# Patient Record
Sex: FEMALE | Race: BLACK OR AFRICAN AMERICAN | URBAN - METROPOLITAN AREA
[De-identification: names, ages, dates, MRNs, and addresses within clinical notes are randomized per-mention and may not be internally consistent; named-entity substitution may affect disease eponyms.]

---

## 2018-04-02 ENCOUNTER — OFFICE VISIT (OUTPATIENT)
Dept: FAMILY MEDICINE CLINIC | Age: 28
End: 2018-04-02

## 2018-04-02 VITALS
OXYGEN SATURATION: 98 % | SYSTOLIC BLOOD PRESSURE: 171 MMHG | HEIGHT: 60 IN | DIASTOLIC BLOOD PRESSURE: 120 MMHG | HEART RATE: 112 BPM | RESPIRATION RATE: 18 BRPM | TEMPERATURE: 100.3 F

## 2018-04-02 DIAGNOSIS — I10 ESSENTIAL HYPERTENSION: ICD-10-CM

## 2018-04-02 DIAGNOSIS — Z76.89 ENCOUNTER TO ESTABLISH CARE: Primary | ICD-10-CM

## 2018-04-02 RX ORDER — AMLODIPINE BESYLATE 5 MG/1
5 TABLET ORAL DAILY
Qty: 30 TAB | Refills: 0 | Status: SHIPPED | OUTPATIENT
Start: 2018-04-02 | End: 2018-04-25 | Stop reason: SDUPTHER

## 2018-04-02 RX ORDER — HYDROCHLOROTHIAZIDE 25 MG/1
12.5 TABLET ORAL DAILY
Qty: 30 TAB | Refills: 0 | Status: SHIPPED | OUTPATIENT
Start: 2018-04-02 | End: 2018-04-25 | Stop reason: SDUPTHER

## 2018-04-02 RX ORDER — POTASSIUM CHLORIDE 750 MG/1
10 TABLET, EXTENDED RELEASE ORAL DAILY
Qty: 30 TAB | Refills: 0 | Status: SHIPPED | OUTPATIENT
Start: 2018-04-02 | End: 2018-05-18 | Stop reason: SDUPTHER

## 2018-04-02 NOTE — PATIENT INSTRUCTIONS
High Blood Pressure: Care Instructions  Your Care Instructions    If your blood pressure is usually above 140/90, you have high blood pressure, or hypertension. That means the top number is 140 or higher or the bottom number is 90 or higher, or both. Despite what a lot of people think, high blood pressure usually doesn't cause headaches or make you feel dizzy or lightheaded. It usually has no symptoms. But it does increase your risk for heart attack, stroke, and kidney or eye damage. The higher your blood pressure, the more your risk increases. Your doctor will give you a goal for your blood pressure. Your goal will be based on your health and your age. An example of a goal is to keep your blood pressure below 140/90. Lifestyle changes, such as eating healthy and being active, are always important to help lower blood pressure. You might also take medicine to reach your blood pressure goal.  Follow-up care is a key part of your treatment and safety. Be sure to make and go to all appointments, and call your doctor if you are having problems. It's also a good idea to know your test results and keep a list of the medicines you take. How can you care for yourself at home? Medical treatment  · If you stop taking your medicine, your blood pressure will go back up. You may take one or more types of medicine to lower your blood pressure. Be safe with medicines. Take your medicine exactly as prescribed. Call your doctor if you think you are having a problem with your medicine. · Talk to your doctor before you start taking aspirin every day. Aspirin can help certain people lower their risk of a heart attack or stroke. But taking aspirin isn't right for everyone, because it can cause serious bleeding. · See your doctor regularly. You may need to see the doctor more often at first or until your blood pressure comes down.   · If you are taking blood pressure medicine, talk to your doctor before you take decongestants or anti-inflammatory medicine, such as ibuprofen. Some of these medicines can raise blood pressure. · Learn how to check your blood pressure at home. Lifestyle changes  · Stay at a healthy weight. This is especially important if you put on weight around the waist. Losing even 10 pounds can help you lower your blood pressure. · If your doctor recommends it, get more exercise. Walking is a good choice. Bit by bit, increase the amount you walk every day. Try for at least 30 minutes on most days of the week. You also may want to swim, bike, or do other activities. · Avoid or limit alcohol. Talk to your doctor about whether you can drink any alcohol. · Try to limit how much sodium you eat to less than 2,300 milligrams (mg) a day. Your doctor may ask you to try to eat less than 1,500 mg a day. · Eat plenty of fruits (such as bananas and oranges), vegetables, legumes, whole grains, and low-fat dairy products. · Lower the amount of saturated fat in your diet. Saturated fat is found in animal products such as milk, cheese, and meat. Limiting these foods may help you lose weight and also lower your risk for heart disease. · Do not smoke. Smoking increases your risk for heart attack and stroke. If you need help quitting, talk to your doctor about stop-smoking programs and medicines. These can increase your chances of quitting for good. When should you call for help? Call 911 anytime you think you may need emergency care. This may mean having symptoms that suggest that your blood pressure is causing a serious heart or blood vessel problem. Your blood pressure may be over 180/110. ? For example, call 911 if:  ? · You have symptoms of a heart attack. These may include:  ¨ Chest pain or pressure, or a strange feeling in the chest.  ¨ Sweating. ¨ Shortness of breath. ¨ Nausea or vomiting.   ¨ Pain, pressure, or a strange feeling in the back, neck, jaw, or upper belly or in one or both shoulders or arms.  ¨ Lightheadedness or sudden weakness. ¨ A fast or irregular heartbeat. ? · You have symptoms of a stroke. These may include:  ¨ Sudden numbness, tingling, weakness, or loss of movement in your face, arm, or leg, especially on only one side of your body. ¨ Sudden vision changes. ¨ Sudden trouble speaking. ¨ Sudden confusion or trouble understanding simple statements. ¨ Sudden problems with walking or balance. ¨ A sudden, severe headache that is different from past headaches. ? · You have severe back or belly pain. ?Do not wait until your blood pressure comes down on its own. Get help right away. ?Call your doctor now or seek immediate care if:  ? · Your blood pressure is much higher than normal (such as 180/110 or higher), but you don't have symptoms. ? · You think high blood pressure is causing symptoms, such as:  ¨ Severe headache. ¨ Blurry vision. ? Watch closely for changes in your health, and be sure to contact your doctor if:  ? · Your blood pressure measures 140/90 or higher at least 2 times. That means the top number is 140 or higher or the bottom number is 90 or higher, or both. ? · You think you may be having side effects from your blood pressure medicine. ? · Your blood pressure is usually normal, but it goes above normal at least 2 times. Where can you learn more? Go to http://michelle-oleg.info/. Enter P135 in the search box to learn more about \"High Blood Pressure: Care Instructions. \"  Current as of: September 21, 2016  Content Version: 11.4  © 3483-6890 [employer]. Care instructions adapted under license by [employer] (which disclaims liability or warranty for this information). If you have questions about a medical condition or this instruction, always ask your healthcare professional. [place] [address] any warranty or liability for your use of this information.        DASH Diet: Care Instructions  Your Care Instructions    The DASH diet is an eating plan that can help lower your blood pressure. DASH stands for Dietary Approaches to Stop Hypertension. Hypertension is high blood pressure. The DASH diet focuses on eating foods that are high in calcium, potassium, and magnesium. These nutrients can lower blood pressure. The foods that are highest in these nutrients are fruits, vegetables, low-fat dairy products, nuts, seeds, and legumes. But taking calcium, potassium, and magnesium supplements instead of eating foods that are high in those nutrients does not have the same effect. The DASH diet also includes whole grains, fish, and poultry. The DASH diet is one of several lifestyle changes your doctor may recommend to lower your high blood pressure. Your doctor may also want you to decrease the amount of sodium in your diet. Lowering sodium while following the DASH diet can lower blood pressure even further than just the DASH diet alone. Follow-up care is a key part of your treatment and safety. Be sure to make and go to all appointments, and call your doctor if you are having problems. It's also a good idea to know your test results and keep a list of the medicines you take. How can you care for yourself at home? Following the DASH diet  · Eat 4 to 5 servings of fruit each day. A serving is 1 medium-sized piece of fruit, ½ cup chopped or canned fruit, 1/4 cup dried fruit, or 4 ounces (½ cup) of fruit juice. Choose fruit more often than fruit juice. · Eat 4 to 5 servings of vegetables each day. A serving is 1 cup of lettuce or raw leafy vegetables, ½ cup of chopped or cooked vegetables, or 4 ounces (½ cup) of vegetable juice. Choose vegetables more often than vegetable juice. · Get 2 to 3 servings of low-fat and fat-free dairy each day. A serving is 8 ounces of milk, 1 cup of yogurt, or 1 ½ ounces of cheese. · Eat 6 to 8 servings of grains each day.  A serving is 1 slice of bread, 1 ounce of dry cereal, or ½ cup of cooked rice, pasta, or cooked cereal. Try to choose whole-grain products as much as possible. · Limit lean meat, poultry, and fish to 2 servings each day. A serving is 3 ounces, about the size of a deck of cards. · Eat 4 to 5 servings of nuts, seeds, and legumes (cooked dried beans, lentils, and split peas) each week. A serving is 1/3 cup of nuts, 2 tablespoons of seeds, or ½ cup of cooked beans or peas. · Limit fats and oils to 2 to 3 servings each day. A serving is 1 teaspoon of vegetable oil or 2 tablespoons of salad dressing. · Limit sweets and added sugars to 5 servings or less a week. A serving is 1 tablespoon jelly or jam, ½ cup sorbet, or 1 cup of lemonade. · Eat less than 2,300 milligrams (mg) of sodium a day. If you limit your sodium to 1,500 mg a day, you can lower your blood pressure even more. Tips for success  · Start small. Do not try to make dramatic changes to your diet all at once. You might feel that you are missing out on your favorite foods and then be more likely to not follow the plan. Make small changes, and stick with them. Once those changes become habit, add a few more changes. · Try some of the following:  ¨ Make it a goal to eat a fruit or vegetable at every meal and at snacks. This will make it easy to get the recommended amount of fruits and vegetables each day. ¨ Try yogurt topped with fruit and nuts for a snack or healthy dessert. ¨ Add lettuce, tomato, cucumber, and onion to sandwiches. ¨ Combine a ready-made pizza crust with low-fat mozzarella cheese and lots of vegetable toppings. Try using tomatoes, squash, spinach, broccoli, carrots, cauliflower, and onions. ¨ Have a variety of cut-up vegetables with a low-fat dip as an appetizer instead of chips and dip. ¨ Sprinkle sunflower seeds or chopped almonds over salads. Or try adding chopped walnuts or almonds to cooked vegetables. ¨ Try some vegetarian meals using beans and peas. Add garbanzo or kidney beans to salads. Make burritos and tacos with mashed méndez beans or black beans. Where can you learn more? Go to http://michelle-oleg.info/. Enter V852 in the search box to learn more about \"DASH Diet: Care Instructions. \"  Current as of: September 21, 2016  Content Version: 11.4  © 0685-7926 [employer]. Care instructions adapted under license by [employer] (which disclaims liability or warranty for this information). If you have questions about a medical condition or this instruction, always ask your healthcare professional. [address] any warranty or liability for your use of this information. Low Sodium Diet (2,000 Milligram): Care Instructions  Your Care Instructions    Too much sodium causes your body to hold on to extra water. This can raise your blood pressure and force your heart and kidneys to work harder. In very serious cases, this could cause you to be put in the hospital. It might even be life-threatening. By limiting sodium, you will feel better and lower your risk of serious problems. The most common source of sodium is salt. People get most of the salt in their diet from canned, prepared, and packaged foods. Fast food and restaurant meals also are very high in sodium. Your doctor will probably limit your sodium to less than 2,000 milligrams (mg) a day. This limit counts all the sodium in prepared and packaged foods and any salt you add to your food. Follow-up care is a key part of your treatment and safety. Be sure to make and go to all appointments, and call your doctor if you are having problems. It's also a good idea to know your test results and keep a list of the medicines you take. How can you care for yourself at home? Read food labels  · Read labels on cans and food packages. The labels tell you how much sodium is in each serving. Make sure that you look at the serving size.  If you eat more than the serving size, you have eaten more sodium. · Food labels also tell you the Percent Daily Value for sodium. Choose products with low Percent Daily Values for sodium. · Be aware that sodium can come in forms other than salt, including monosodium glutamate (MSG), sodium citrate, and sodium bicarbonate (baking soda). MSG is often added to Asian food. When you eat out, you can sometimes ask for food without MSG or added salt. Buy low-sodium foods  · Buy foods that are labeled \"unsalted\" (no salt added), \"sodium-free\" (less than 5 mg of sodium per serving), or \"low-sodium\" (less than 140 mg of sodium per serving). Foods labeled \"reduced-sodium\" and \"light sodium\" may still have too much sodium. Be sure to read the label to see how much sodium you are getting. · Buy fresh vegetables, or frozen vegetables without added sauces. Buy low-sodium versions of canned vegetables, soups, and other canned goods. Prepare low-sodium meals  · Cut back on the amount of salt you use in cooking. This will help you adjust to the taste. Do not add salt after cooking. One teaspoon of salt has about 2,300 mg of sodium. · Take the salt shaker off the table. · Flavor your food with garlic, lemon juice, onion, vinegar, herbs, and spices. Do not use soy sauce, lite soy sauce, steak sauce, onion salt, garlic salt, celery salt, mustard, or ketchup on your food. · Use low-sodium salad dressings, sauces, and ketchup. Or make your own salad dressings and sauces without adding salt. · Use less salt (or none) when recipes call for it. You can often use half the salt a recipe calls for without losing flavor. Other foods such as rice, pasta, and grains do not need added salt. · Rinse canned vegetables, and cook them in fresh water. This removes some-but not all-of the salt. · Avoid water that is naturally high in sodium or that has been treated with water softeners, which add sodium. Call your local water company to find out the sodium content of your water supply.  If you buy bottled water, read the label and choose a sodium-free brand. Avoid high-sodium foods  · Avoid eating:  ¨ Smoked, cured, salted, and canned meat, fish, and poultry. ¨ Ham, macias, hot dogs, and luncheon meats. ¨ Regular, hard, and processed cheese and regular peanut butter. ¨ Crackers with salted tops, and other salted snack foods such as pretzels, chips, and salted popcorn. ¨ Frozen prepared meals, unless labeled low-sodium. ¨ Canned and dried soups, broths, and bouillon, unless labeled sodium-free or low-sodium. ¨ Canned vegetables, unless labeled sodium-free or low-sodium. ¨ Western Eveline fries, pizza, tacos, and other fast foods. ¨ Pickles, olives, ketchup, and other condiments, especially soy sauce, unless labeled sodium-free or low-sodium. Where can you learn more? Go to http://michelle-oleg.info/. Enter Q256 in the search box to learn more about \"Low Sodium Diet (2,000 Milligram): Care Instructions. \"  Current as of: May 12, 2017  Content Version: 11.4  © 5014-2199 [employer]. Care instructions adapted under license by [employer] (which disclaims liability or warranty for this information). If you have questions about a medical condition or this instruction, always ask your healthcare professional. [place] [address] any warranty or liability for your use of this information.

## 2018-04-02 NOTE — PROGRESS NOTES
Chief Complaint   Patient presents with    New Patient     Patient was seen in the [facility] ER 3/2018 for left eye pain. Patient states that she received antibiotics from the ER and was told she had high BP. Patient being seen today to establish care with new provider. 1. Have you been to the ER, urgent care clinic since your last visit? Hospitalized since your last visit? Yes When: 3/2018 Where: [facility] Reason for visit: left eye     2. Have you seen or consulted any other health care providers outside of the [address] [place] [address] since your last visit? Include any pap smears or colon screening.  Yes When: 3/2018 Where: [facility] Reason for visit: Left Eye pain

## 2018-04-02 NOTE — PROGRESS NOTES
HPI  Rosaura Hidalgo is a 32 y.o. female  Chief Complaint   Patient presents with    New Patient     Here to establish care. Has not seen a PCP in over 10 years. Reports going to ER recently for an eye infection which she is on antibiotics for but her symptoms have improved. Reports her blood pressure was elevated. Denies chest pain and or shortness of breath. Denies dizziness or blurry vision. Reports walking 2 hours every other day with just recently restarting exercise. Admits to unhealthy diet. Past Medical History  No past medical history on file. Surgical History  No past surgical history on file. Medications      Allergies  No Known Allergies    Family History  Family History   Problem Relation Age of Onset    Hypertension Mother     Hypertension Father     Hypertension Paternal Grandmother     Hypertension Paternal Grandfather        Social History  Social History     Social History    Marital status: SINGLE     Spouse name: N/A    Number of children: N/A    Years of education: N/A     Occupational History    Not on file. Social History Main Topics    Smoking status: Former Smoker     Start date: 4/2/2008     Quit date: 6/2/2013    Smokeless tobacco: Never Used    Alcohol use 1.2 - 1.8 oz/week     1 - 2 Glasses of wine, 0 Cans of beer, 1 Shots of liquor, 0 Standard drinks or equivalent per week    Drug use: No    Sexual activity: Yes     Partners: Male     Birth control/ protection: Condom     Other Topics Concern    Not on file     Social History Narrative    No narrative on file       Problem List  There is no problem list on file for this patient. Review of Systems  Review of Systems   Eyes: Positive for discharge and redness. Negative for blurred vision. Respiratory: Negative for shortness of breath. Cardiovascular: Negative for chest pain. Gastrointestinal: Negative for abdominal pain, nausea and vomiting. Neurological: Positive for headaches.  Negative for dizziness. Vital Signs  Vitals:    04/02/18 0944 04/02/18 0955 04/02/18 0956   BP: (!) 173/121 (!) 118/116 (!) 171/120   Pulse: (!) 112     Resp: 18     Temp: 100.3 °F (37.9 °C)     TempSrc: Oral     SpO2: 98%     Height: 5' (1.524 m)     PainSc:   0 - No pain     LMP: 03/14/2018       Physical Exam  Physical Exam   Constitutional: She is oriented to person, place, and time. HENT:   Nose: Nose normal.   Mouth/Throat: Oropharynx is clear and moist.   Eyes: Conjunctivae and EOM are normal. Pupils are equal, round, and reactive to light. Cardiovascular: Normal rate, regular rhythm, normal heart sounds and intact distal pulses. No murmur heard. Pulmonary/Chest: Effort normal and breath sounds normal. No respiratory distress. She has no wheezes. Musculoskeletal: She exhibits no edema. Neurological: She is alert and oriented to person, place, and time. Skin: Skin is warm and dry. Psychiatric: She has a normal mood and affect. Her behavior is normal.   Vitals reviewed. Diagnostics  Orders Placed This Encounter    CBC WITH AUTOMATED DIFF     Standing Status:   Future     Number of Occurrences:   1     Standing Expiration Date:   4/5/7598    METABOLIC PANEL, COMPREHENSIVE     Standing Status:   Future     Number of Occurrences:   1     Standing Expiration Date:   9/30/2018    LIPID PANEL     Standing Status:   Future     Number of Occurrences:   1     Standing Expiration Date:   9/30/2018    HEMOGLOBIN A1C WITH EAG     Standing Status:   Future     Number of Occurrences:   1     Standing Expiration Date:   4/3/2019    VITAMIN D, 25 HYDROXY     Standing Status:   Future     Number of Occurrences:   1     Standing Expiration Date:   4/2/2019    amLODIPine (NORVASC) 5 mg tablet     Sig: Take 1 Tab by mouth daily. Dispense:  30 Tab     Refill:  0    hydroCHLOROthiazide (HYDRODIURIL) 25 mg tablet     Sig: Take 0.5 Tabs by mouth daily.      Dispense:  30 Tab     Refill:  0    potassium chloride (KLOR-CON) 10 mEq tablet     Sig: Take 1 Tab by mouth daily. Dispense:  30 Tab     Refill:  0       Results  No results found for this or any previous visit. Assessment and Plan  Diagnoses and all orders for this visit:    1. Encounter to establish care  -     amLODIPine (NORVASC) 5 mg tablet; Take 1 Tab by mouth daily. -     hydroCHLOROthiazide (HYDRODIURIL) 25 mg tablet; Take 0.5 Tabs by mouth daily. -     potassium chloride (KLOR-CON) 10 mEq tablet; Take 1 Tab by mouth daily. 2. Essential hypertension  -     CBC WITH AUTOMATED DIFF; Future  -     METABOLIC PANEL, COMPREHENSIVE; Future  -     LIPID PANEL; Future  -     HEMOGLOBIN A1C WITH EAG; Future  -     VITAMIN D, 25 HYDROXY; Future      Discussed signs and symptoms of stroke, MI, or TIA. Instructed when to seek emergency assistance. Patient verbalized understanding. More than 50% of 60 minute visit spent counseling and coordinating care with patient face to face on blood pressure, diet, exercise, norvasc, hydrochlorothiazide, potassium, labs, weight, signs and symptoms of stroke, MI, and TIA, and reducing stress. After care summary printed and reviewed with patient. Plan reviewed with patient. Questions answered. Patient verbalized understanding of plan and is in agreement with plan. Patient to follow up in one week or earlier if symptoms worsen.     CHRIST Yusuf

## 2018-04-02 NOTE — MR AVS SNAPSHOT
[address] [place] [address] 
 
 
 [address] [place] [id number] [phone] Patient: Samantha Keating MRN: [id number] [id number] Visit Information Date & Time Provider Department Dept. Phone Encounter #  
 4/2/2018  9:30 AM Markie Millard, [facility] [address] [id number] Follow-up Instructions Return in about 1 week (around 4/9/2018), or if symptoms worsen or fail to improve, for 45 min follow up . Upcoming Health Maintenance Date Due  
 PAP AKA CERVICAL CYTOLOGY 5/12/2011 DTaP/Tdap/Td series (2 - Td) 4/2/2028 Allergies as of 4/2/2018  Review Complete On: 4/2/2018 By: Fatuma Church LPN No Known Allergies Current Immunizations  Never Reviewed No immunizations on file. Not reviewed this visit You Were Diagnosed With   
  
 Codes Comments Encounter to establish care    -  Primary ICD-10-CM: Z76.89 
ICD-9-CM: V65.8 Essential hypertension     ICD-10-CM: I10 
ICD-9-CM: 401.9 Vitals BP Pulse Temp Resp Height(growth percentile) LMP  
 (!) 171/120 (BP 1 Location: Left arm, BP Patient Position: Sitting) (!) 112 100.3 °F (37.9 °C) (Oral) 18 5' (1.524 m) 03/14/2018 SpO2 OB Status Smoking Status 98% Having regular periods Former Smoker Vitals History Preferred Pharmacy Pharmacy Name Phone [facility] [address] [facility], [address] Your Updated Medication List  
  
   
This list is accurate as of 4/2/18 10:31 AM.  Always use your most recent med list. amLODIPine 5 mg tablet Commonly known as:  Bustillo Barges Take 1 Tab by mouth daily. hydroCHLOROthiazide 25 mg tablet Commonly known as:  HYDRODIURIL Take 0.5 Tabs by mouth daily. potassium chloride 10 mEq tablet Commonly known as:  KLOR-CON Take 1 Tab by mouth daily. Prescriptions Sent to Pharmacy Refills amLODIPine (NORVASC) 5 mg tablet 0 Sig: Take 1 Tab by mouth daily. Class: Normal  
 Pharmacy: [facility] DR MARRY MORENO [address] Ph #: [phone] Route: Oral  
 hydroCHLOROthiazide (HYDRODIURIL) 25 mg tablet 0 Sig: Take 0.5 Tabs by mouth daily. Class: Normal  
 Pharmacy: [facility] DR MARRY MORENO [address] Ph #: [phone] Route: Oral  
 potassium chloride (KLOR-CON) 10 mEq tablet 0 Sig: Take 1 Tab by mouth daily. Class: Normal  
 Pharmacy: [facility] [address] MARRY MORENO [address] Ph #: [phone] Route: Oral  
  
Follow-up Instructions Return in about 1 week (around 4/9/2018), or if symptoms worsen or fail to improve, for 45 min follow up . To-Do List   
 04/02/2018 Lab:  CBC WITH AUTOMATED DIFF   
  
 04/02/2018 Lab:  HEMOGLOBIN A1C WITH EAG   
  
 04/02/2018 Lab:  VITAMIN D, 25 HYDROXY Around 07/01/2018 Lab:  LIPID PANEL Around 07/01/2018 Lab:  METABOLIC PANEL, COMPREHENSIVE Patient Instructions High Blood Pressure: Care Instructions Your Care Instructions If your blood pressure is usually above 140/90, you have high blood pressure, or hypertension. That means the top number is 140 or higher or the bottom number is 90 or higher, or both. Despite what a lot of people think, high blood pressure usually doesn't cause headaches or make you feel dizzy or lightheaded. It usually has no symptoms. But it does increase your risk for heart attack, stroke, and kidney or eye damage. The higher your blood pressure, the more your risk increases. Your doctor will give you a goal for your blood pressure. Your goal will be based on your health and your age. An example of a goal is to keep your blood pressure below 140/90. Lifestyle changes, such as eating healthy and being active, are always important to help lower blood pressure.  You might also take medicine to reach your blood pressure goal. 
Follow-up care is a key part of your treatment and safety. Be sure to make and go to all appointments, and call your doctor if you are having problems. It's also a good idea to know your test results and keep a list of the medicines you take. How can you care for yourself at home? Medical treatment · If you stop taking your medicine, your blood pressure will go back up. You may take one or more types of medicine to lower your blood pressure. Be safe with medicines. Take your medicine exactly as prescribed. Call your doctor if you think you are having a problem with your medicine. · Talk to your doctor before you start taking aspirin every day. Aspirin can help certain people lower their risk of a heart attack or stroke. But taking aspirin isn't right for everyone, because it can cause serious bleeding. · See your doctor regularly. You may need to see the doctor more often at first or until your blood pressure comes down. · If you are taking blood pressure medicine, talk to your doctor before you take decongestants or anti-inflammatory medicine, such as ibuprofen. Some of these medicines can raise blood pressure. · Learn how to check your blood pressure at home. Lifestyle changes · Stay at a healthy weight. This is especially important if you put on weight around the waist. Losing even 10 pounds can help you lower your blood pressure. · If your doctor recommends it, get more exercise. Walking is a good choice. Bit by bit, increase the amount you walk every day. Try for at least 30 minutes on most days of the week. You also may want to swim, bike, or do other activities. · Avoid or limit alcohol. Talk to your doctor about whether you can drink any alcohol. · Try to limit how much sodium you eat to less than 2,300 milligrams (mg) a day. Your doctor may ask you to try to eat less than 1,500 mg a day.  
· Eat plenty of fruits (such as bananas and oranges), vegetables, legumes, whole grains, and low-fat dairy products. · Lower the amount of saturated fat in your diet. Saturated fat is found in animal products such as milk, cheese, and meat. Limiting these foods may help you lose weight and also lower your risk for heart disease. · Do not smoke. Smoking increases your risk for heart attack and stroke. If you need help quitting, talk to your doctor about stop-smoking programs and medicines. These can increase your chances of quitting for good. When should you call for help? Call 911 anytime you think you may need emergency care. This may mean having symptoms that suggest that your blood pressure is causing a serious heart or blood vessel problem. Your blood pressure may be over 180/110. ? For example, call 911 if: 
? · You have symptoms of a heart attack. These may include: ¨ Chest pain or pressure, or a strange feeling in the chest. 
¨ Sweating. ¨ Shortness of breath. ¨ Nausea or vomiting. ¨ Pain, pressure, or a strange feeling in the back, neck, jaw, or upper belly or in one or both shoulders or arms. ¨ Lightheadedness or sudden weakness. ¨ A fast or irregular heartbeat. ? · You have symptoms of a stroke. These may include: 
¨ Sudden numbness, tingling, weakness, or loss of movement in your face, arm, or leg, especially on only one side of your body. ¨ Sudden vision changes. ¨ Sudden trouble speaking. ¨ Sudden confusion or trouble understanding simple statements. ¨ Sudden problems with walking or balance. ¨ A sudden, severe headache that is different from past headaches. ? · You have severe back or belly pain. ?Do not wait until your blood pressure comes down on its own. Get help right away. ?Call your doctor now or seek immediate care if: 
? · Your blood pressure is much higher than normal (such as 180/110 or higher), but you don't have symptoms. ? · You think high blood pressure is causing symptoms, such as: ¨ Severe headache. ¨ Blurry vision. ?Watch closely for changes in your health, and be sure to contact your doctor if: 
? · Your blood pressure measures 140/90 or higher at least 2 times. That means the top number is 140 or higher or the bottom number is 90 or higher, or both. ? · You think you may be having side effects from your blood pressure medicine. ? · Your blood pressure is usually normal, but it goes above normal at least 2 times. Where can you learn more? Go to http://michelle-oleg.info/. Enter X200 in the search box to learn more about \"High Blood Pressure: Care Instructions. \" Current as of: September 21, 2016 Content Version: 11.4 © 6944-0560 "[employer]. Care instructions adapted under license by [employer] (which disclaims liability or warranty for this information). If you have questions about a medical condition or this instruction, always ask your healthcare professional. [place] [address] any warranty or liability for your use of this information. DASH Diet: Care Instructions Your Care Instructions The DASH diet is an eating plan that can help lower your blood pressure. DASH stands for Dietary Approaches to Stop Hypertension. Hypertension is high blood pressure. The DASH diet focuses on eating foods that are high in calcium, potassium, and magnesium. These nutrients can lower blood pressure. The foods that are highest in these nutrients are fruits, vegetables, low-fat dairy products, nuts, seeds, and legumes. But taking calcium, potassium, and magnesium supplements instead of eating foods that are high in those nutrients does not have the same effect. The DASH diet also includes whole grains, fish, and poultry. The DASH diet is one of several lifestyle changes your doctor may recommend to lower your high blood pressure. Your doctor may also want you to decrease the amount of sodium in your diet.  Lowering sodium while following the DASH diet can lower blood pressure even further than just the DASH diet alone. Follow-up care is a key part of your treatment and safety. Be sure to make and go to all appointments, and call your doctor if you are having problems. It's also a good idea to know your test results and keep a list of the medicines you take. How can you care for yourself at home? Following the DASH diet · Eat 4 to 5 servings of fruit each day. A serving is 1 medium-sized piece of fruit, ½ cup chopped or canned fruit, 1/4 cup dried fruit, or 4 ounces (½ cup) of fruit juice. Choose fruit more often than fruit juice. · Eat 4 to 5 servings of vegetables each day. A serving is 1 cup of lettuce or raw leafy vegetables, ½ cup of chopped or cooked vegetables, or 4 ounces (½ cup) of vegetable juice. Choose vegetables more often than vegetable juice. · Get 2 to 3 servings of low-fat and fat-free dairy each day. A serving is 8 ounces of milk, 1 cup of yogurt, or 1 ½ ounces of cheese. · Eat 6 to 8 servings of grains each day. A serving is 1 slice of bread, 1 ounce of dry cereal, or ½ cup of cooked rice, pasta, or cooked cereal. Try to choose whole-grain products as much as possible. · Limit lean meat, poultry, and fish to 2 servings each day. A serving is 3 ounces, about the size of a deck of cards. · Eat 4 to 5 servings of nuts, seeds, and legumes (cooked dried beans, lentils, and split peas) each week. A serving is 1/3 cup of nuts, 2 tablespoons of seeds, or ½ cup of cooked beans or peas. · Limit fats and oils to 2 to 3 servings each day. A serving is 1 teaspoon of vegetable oil or 2 tablespoons of salad dressing. · Limit sweets and added sugars to 5 servings or less a week. A serving is 1 tablespoon jelly or jam, ½ cup sorbet, or 1 cup of lemonade. · Eat less than 2,300 milligrams (mg) of sodium a day. If you limit your sodium to 1,500 mg a day, you can lower your blood pressure even more. Tips for success · Start small. Do not try to make dramatic changes to your diet all at once. You might feel that you are missing out on your favorite foods and then be more likely to not follow the plan. Make small changes, and stick with them. Once those changes become habit, add a few more changes. · Try some of the following: ¨ Make it a goal to eat a fruit or vegetable at every meal and at snacks. This will make it easy to get the recommended amount of fruits and vegetables each day. ¨ Try yogurt topped with fruit and nuts for a snack or healthy dessert. ¨ Add lettuce, tomato, cucumber, and onion to sandwiches. ¨ Combine a ready-made pizza crust with low-fat mozzarella cheese and lots of vegetable toppings. Try using tomatoes, squash, spinach, broccoli, carrots, cauliflower, and onions. ¨ Have a variety of cut-up vegetables with a low-fat dip as an appetizer instead of chips and dip. ¨ Sprinkle sunflower seeds or chopped almonds over salads. Or try adding chopped walnuts or almonds to cooked vegetables. ¨ Try some vegetarian meals using beans and peas. Add garbanzo or kidney beans to salads. Make burritos and tacos with mashed méndez beans or black beans. Where can you learn more? Go to http://michelle-oleg.info/. Enter G576 in the search box to learn more about \"DASH Diet: Care Instructions. \" Current as of: September 21, 2016 Content Version: 11.4 © 6391-0423 [employer]. Care instructions adapted under license by [employer] (which disclaims liability or warranty for this information). If you have questions about a medical condition or this instruction, always ask your healthcare professional. [place] [address] any warranty or liability for your use of this information. Low Sodium Diet (2,000 Milligram): Care Instructions Your Care Instructions Too much sodium causes your body to hold on to extra water.  This can raise your blood pressure and force your heart and kidneys to work harder. In very serious cases, this could cause you to be put in the hospital. It might even be life-threatening. By limiting sodium, you will feel better and lower your risk of serious problems. The most common source of sodium is salt. People get most of the salt in their diet from canned, prepared, and packaged foods. Fast food and restaurant meals also are very high in sodium. Your doctor will probably limit your sodium to less than 2,000 milligrams (mg) a day. This limit counts all the sodium in prepared and packaged foods and any salt you add to your food. Follow-up care is a key part of your treatment and safety. Be sure to make and go to all appointments, and call your doctor if you are having problems. It's also a good idea to know your test results and keep a list of the medicines you take. How can you care for yourself at home? Read food labels · Read labels on cans and food packages. The labels tell you how much sodium is in each serving. Make sure that you look at the serving size. If you eat more than the serving size, you have eaten more sodium. · Food labels also tell you the Percent Daily Value for sodium. Choose products with low Percent Daily Values for sodium. · Be aware that sodium can come in forms other than salt, including monosodium glutamate (MSG), sodium citrate, and sodium bicarbonate (baking soda). MSG is often added to Asian food. When you eat out, you can sometimes ask for food without MSG or added salt. Buy low-sodium foods · Buy foods that are labeled \"unsalted\" (no salt added), \"sodium-free\" (less than 5 mg of sodium per serving), or \"low-sodium\" (less than 140 mg of sodium per serving). Foods labeled \"reduced-sodium\" and \"light sodium\" may still have too much sodium. Be sure to read the label to see how much sodium you are getting. · Buy fresh vegetables, or frozen vegetables without added sauces.  Buy low-sodium versions of canned vegetables, soups, and other canned goods. Prepare low-sodium meals · Cut back on the amount of salt you use in cooking. This will help you adjust to the taste. Do not add salt after cooking. One teaspoon of salt has about 2,300 mg of sodium. · Take the salt shaker off the table. · Flavor your food with garlic, lemon juice, onion, vinegar, herbs, and spices. Do not use soy sauce, lite soy sauce, steak sauce, onion salt, garlic salt, celery salt, mustard, or ketchup on your food. · Use low-sodium salad dressings, sauces, and ketchup. Or make your own salad dressings and sauces without adding salt. · Use less salt (or none) when recipes call for it. You can often use half the salt a recipe calls for without losing flavor. Other foods such as rice, pasta, and grains do not need added salt. · Rinse canned vegetables, and cook them in fresh water. This removes some-but not all-of the salt. · Avoid water that is naturally high in sodium or that has been treated with water softeners, which add sodium. Call your local water company to find out the sodium content of your water supply. If you buy bottled water, read the label and choose a sodium-free brand. Avoid high-sodium foods · Avoid eating: ¨ Smoked, cured, salted, and canned meat, fish, and poultry. ¨ Ham, macias, hot dogs, and luncheon meats. ¨ Regular, hard, and processed cheese and regular peanut butter. ¨ Crackers with salted tops, and other salted snack foods such as pretzels, chips, and salted popcorn. ¨ Frozen prepared meals, unless labeled low-sodium. ¨ Canned and dried soups, broths, and bouillon, unless labeled sodium-free or low-sodium. ¨ Canned vegetables, unless labeled sodium-free or low-sodium. ¨ Western Eveline fries, pizza, tacos, and other fast foods. ¨ Pickles, olives, ketchup, and other condiments, especially soy sauce, unless labeled sodium-free or low-sodium. Where can you learn more? Go to http://michelle-oleg.info/. Enter W737 in the search box to learn more about \"Low Sodium Diet (2,000 Milligram): Care Instructions. \" Current as of: May 12, 2017 Content Version: 11.4 © 8146-2345 [employer]. Care instructions adapted under license by Mamina Shk[employer] (which disclaims liability or warranty for this information). If you have questions about a medical condition or this instruction, always ask your healthcare professional. [place] [address] any warranty or liability for your use of this information. Introducing [facility]! [employer] introduces [employer]aps[employer] patient portal. Now you can access parts of your medical record, email your doctor's office, and request medication refills online. 1. In your internet browser, go to [email or web address] 2. Click on the First Time User? Click Here link in the Sign In box. You will see the New Member Sign Up page. 3. Enter your AdTapsy Access Code exactly as it appears below. You will not need to use this code after youve completed the sign-up process. If you do not sign up before the expiration date, you must request a new code. · AdTapsy Access Code: [email or web address] Expires: 7/1/2018  9:27 AM 
 
4. Enter the last four digits of your Social Security Number (xxxx) and Date of Birth (mm/dd/yyyy) as indicated and click Submit. You will be taken to the next sign-up page. 5. Create a AdTapsy ID. This will be your AdTapsy login ID and cannot be changed, so think of one that is secure and easy to remember. 6. Create a AdTapsy password. You can change your password at any time. 7. Enter your Password Reset Question and Answer. This can be used at a later time if you forget your password. 8. Enter your e-mail address. You will receive e-mail notification when new information is available in [address] 9. Click Sign Up. You can now view and download portions of your medical record. 10. Click the Download Summary menu link to download a portable copy of your medical information. If you have questions, please visit the Frequently Asked Questions section of the [employer] website. Remember, [employer] is NOT to be used for urgent needs. For medical emergencies, dial 911. Now available from your iPhone and Android! Please provide this summary of care documentation to your next provider. Your primary care clinician is listed as María Reyes. If you have any questions after today's visit, please call [phone].

## 2018-04-03 LAB
25(OH)D3 SERPL-MCNC: 11.9 NG/ML (ref 32–100)
A-G RATIO,AGRAT: 1.3 RATIO (ref 1.1–2.6)
ABSOLUTE LYMPHOCYTE COUNT, 10803: 2.8 K/UL (ref 1–4.8)
ALBUMIN SERPL-MCNC: 4.8 G/DL (ref 3.5–5)
ALP SERPL-CCNC: 85 U/L (ref 25–115)
ALT SERPL-CCNC: 21 U/L (ref 5–40)
ANION GAP SERPL CALC-SCNC: 18 MMOL/L
AST SERPL W P-5'-P-CCNC: 13 U/L (ref 10–37)
AVG GLU, 10930: 313 MG/DL (ref 91–123)
BASOPHILS # BLD: 0 K/UL (ref 0–0.2)
BASOPHILS NFR BLD: 0 % (ref 0–2)
BILIRUB SERPL-MCNC: 0.3 MG/DL (ref 0.2–1.2)
BUN SERPL-MCNC: 8 MG/DL (ref 6–22)
CALCIUM SERPL-MCNC: 9.8 MG/DL (ref 8.4–10.5)
CHLORIDE SERPL-SCNC: 95 MMOL/L (ref 98–110)
CHOLEST SERPL-MCNC: 264 MG/DL (ref 110–200)
CO2 SERPL-SCNC: 25 MMOL/L (ref 20–32)
CREAT SERPL-MCNC: 0.5 MG/DL (ref 0.5–1.2)
EOSINOPHIL # BLD: 0.1 K/UL (ref 0–0.5)
EOSINOPHIL NFR BLD: 1 % (ref 0–6)
ERYTHROCYTE [DISTWIDTH] IN BLOOD BY AUTOMATED COUNT: 14.6 % (ref 10–15.5)
GFRAA, 66117: >60
GFRNA, 66118: >60
GLOBULIN,GLOB: 3.6 G/DL (ref 2–4)
GLUCOSE SERPL-MCNC: 311 MG/DL (ref 70–99)
GRANULOCYTES,GRANS: 58 % (ref 40–75)
HBA1C MFR BLD HPLC: 12.5 % (ref 4.8–5.9)
HCT VFR BLD AUTO: 42.1 % (ref 35.1–46.5)
HDLC SERPL-MCNC: 33 MG/DL (ref 40–59)
HDLC SERPL-MCNC: 8 MG/DL (ref 0–5)
HGB BLD-MCNC: 14.1 G/DL (ref 11.7–15.5)
LDLC SERPL CALC-MCNC: 180 MG/DL (ref 50–99)
LYMPHOCYTES, LYMLT: 35 % (ref 20–45)
MCH RBC QN AUTO: 29 PG (ref 26–34)
MCHC RBC AUTO-ENTMCNC: 34 G/DL (ref 31–36)
MCV RBC AUTO: 87 FL (ref 80–95)
MONOCYTES # BLD: 0.5 K/UL (ref 0.1–1)
MONOCYTES NFR BLD: 6 % (ref 3–12)
NEUTROPHILS # BLD AUTO: 4.8 K/UL (ref 1.8–7.7)
PLATELET # BLD AUTO: 350 K/UL (ref 140–440)
PMV BLD AUTO: 11.6 FL (ref 9–13)
POTASSIUM SERPL-SCNC: 4.2 MMOL/L (ref 3.5–5.5)
PROT SERPL-MCNC: 8.4 G/DL (ref 6.4–8.3)
RBC # BLD AUTO: 4.86 M/UL (ref 3.8–5.2)
SODIUM SERPL-SCNC: 138 MMOL/L (ref 133–145)
TRIGL SERPL-MCNC: 253 MG/DL (ref 40–149)
VLDLC SERPL CALC-MCNC: 51 MG/DL (ref 8–30)
WBC # BLD AUTO: 8.2 K/UL (ref 4–11)

## 2018-04-05 DIAGNOSIS — E55.9 VITAMIN D DEFICIENCY: Primary | ICD-10-CM

## 2018-04-05 RX ORDER — ERGOCALCIFEROL 1.25 MG/1
50000 CAPSULE ORAL
Qty: 12 CAP | Refills: 3 | Status: SHIPPED | OUTPATIENT
Start: 2018-04-05 | End: 2018-06-15 | Stop reason: SDUPTHER

## 2018-04-05 NOTE — PROGRESS NOTES
Please contact patient and ask her to move her appointment up. Her glucose is 311 with her hemoglobin A1C being 12.5. She has diabetes and we need to discuss treatment options. Her vitamin d is deficient at 11.9 and she needs a high dose vitamin D supplement which I will send to her pharmacy. She is to take 50,000 units weekly for 12 weeks we will then repeat her lab. Her Cholesterol level is high and all of her lipid values are abnormal and we also need to discuss treatment for this. Initiate lifestyle changes such as exercise at least 3 times a week at 45 min intervals along with a diet low in saturated fats. Increase soluble fiber in diet such as fruit and vegetables which will help lower LDL.  [facility]

## 2018-04-06 NOTE — PROGRESS NOTES
Patient notified of providers comments on results listed. Patient has an appointment for 4/10/2018 in the morning. Patient is aware that her results are to be discussed at her office visit.

## 2018-04-10 ENCOUNTER — OFFICE VISIT (OUTPATIENT)
Dept: FAMILY MEDICINE CLINIC | Age: 28
End: 2018-04-10

## 2018-04-10 VITALS
HEIGHT: 61 IN | OXYGEN SATURATION: 100 % | WEIGHT: 199 LBS | RESPIRATION RATE: 18 BRPM | SYSTOLIC BLOOD PRESSURE: 120 MMHG | TEMPERATURE: 99.3 F | DIASTOLIC BLOOD PRESSURE: 90 MMHG | HEART RATE: 100 BPM | BODY MASS INDEX: 37.57 KG/M2

## 2018-04-10 DIAGNOSIS — E78.2 MIXED HYPERLIPIDEMIA: ICD-10-CM

## 2018-04-10 DIAGNOSIS — Z71.89 ENCOUNTER FOR DIABETES EDUCATION: ICD-10-CM

## 2018-04-10 DIAGNOSIS — E11.8 CONTROLLED DIABETES MELLITUS TYPE 2 WITH COMPLICATIONS, UNSPECIFIED WHETHER LONG TERM INSULIN USE (HCC): Primary | ICD-10-CM

## 2018-04-10 DIAGNOSIS — I10 ESSENTIAL HYPERTENSION: ICD-10-CM

## 2018-04-10 DIAGNOSIS — Z71.2 ENCOUNTER TO DISCUSS TEST RESULTS: ICD-10-CM

## 2018-04-10 DIAGNOSIS — E55.9 VITAMIN D DEFICIENCY: ICD-10-CM

## 2018-04-10 PROBLEM — E66.01 SEVERE OBESITY (BMI 35.0-39.9) WITH COMORBIDITY (HCC): Status: ACTIVE | Noted: 2018-04-10

## 2018-04-10 RX ORDER — INSULIN PUMP SYRINGE, 3 ML
EACH MISCELLANEOUS
Qty: 1 KIT | Refills: 0 | Status: SHIPPED | OUTPATIENT
Start: 2018-04-10

## 2018-04-10 RX ORDER — METFORMIN HYDROCHLORIDE 500 MG/1
500 TABLET ORAL 2 TIMES DAILY WITH MEALS
Qty: 60 TAB | Refills: 0 | Status: SHIPPED | OUTPATIENT
Start: 2018-04-10 | End: 2018-04-25 | Stop reason: DRUGHIGH

## 2018-04-10 RX ORDER — BLOOD SUGAR DIAGNOSTIC
1 STRIP MISCELLANEOUS 2 TIMES DAILY
Qty: 100 PEN NEEDLE | Refills: 0 | Status: SHIPPED | OUTPATIENT
Start: 2018-04-10

## 2018-04-10 RX ORDER — INSULIN GLARGINE 100 [IU]/ML
INJECTION, SOLUTION SUBCUTANEOUS
Qty: 3 PEN | Refills: 0 | Status: SHIPPED | OUTPATIENT
Start: 2018-04-10 | End: 2021-07-29 | Stop reason: ALTCHOICE

## 2018-04-10 NOTE — MR AVS SNAPSHOT
[facility] 
 
 
 [address] [place] [id number] 
[phone] Patient: Shine Shirley MRN: [id number] TQI:[id number] Visit Information Date & Time Provider Department Dept. Phone Encounter #  
 4/10/2018  8:45 AM Peewee Gant NP [address] [id number] Follow-up Instructions Return in about 1 week (around 4/17/2018), or if symptoms worsen or fail to improve, for 45 min follow up new DM. Upcoming Health Maintenance Date Due  
 PAP AKA CERVICAL CYTOLOGY 5/12/2011 DTaP/Tdap/Td series (2 - Td) 4/2/2028 Allergies as of 4/10/2018  Review Complete On: 4/10/2018 By: Mika Sanchez LPN No Known Allergies Current Immunizations  Never Reviewed No immunizations on file. Not reviewed this visit You Were Diagnosed With   
  
 Codes Comments Controlled diabetes mellitus type 2 with complications, unspecified whether long term insulin use (HCC)    -  Primary ICD-10-CM: E11.8 ICD-9-CM: 250.90 Mixed hyperlipidemia     ICD-10-CM: E78.2 ICD-9-CM: 272.2 Essential hypertension     ICD-10-CM: I10 
ICD-9-CM: 401.9 Vitals BP Pulse Temp Resp Height(growth percentile) Weight(growth percentile) 120/90 (BP 1 Location: Right arm, BP Patient Position: Sitting) (!) 134 99.3 °F (37.4 °C) (Oral) 18 5' 1\" (1.549 m) 199 lb (90.3 kg) LMP SpO2 BMI OB Status Smoking Status 03/14/2018 100% 37.6 kg/m2 Having regular periods Former Smoker Vitals History BMI and BSA Data Body Mass Index Body Surface Area  
 37.6 kg/m 2 1.97 m 2 Preferred Pharmacy Pharmacy Name Phone [address], [address] Your Updated Medication List  
  
   
This list is accurate as of 4/10/18 10:48 AM.  Always use your most recent med list. amLODIPine 5 mg tablet Commonly known as:  Billie Duncan Take 1 Tab by mouth daily. Blood-Glucose Meter monitoring kit Use once in the morning for fasting blood glucose and once two hours after a meal  
  
 dulaglutide 0.75 mg/0.5 mL sub-q pen Commonly known as:  TRULICITY  
0.5 mL by SubCUTAneous route every seven (7) days. Indications: type 2 diabetes mellitus  
  
 ergocalciferol 50,000 unit capsule Commonly known as:  ERGOCALCIFEROL Take 1 Cap by mouth every seven (7) days. glucose blood VI test strips strip Commonly known as:  blood glucose test  
Use once in the morning for fasting blood glucose and once two hours after a meal  
  
 hydroCHLOROthiazide 25 mg tablet Commonly known as:  HYDRODIURIL Take 0.5 Tabs by mouth daily. insulin glargine 100 unit/mL (3 mL) Inpn Commonly known as:  LANTUS,BASAGLAR  
10 units nightly  Indications: type 2 diabetes mellitus Insulin Needles (Disposable) 32 gauge x 1/4\" Ndle Commonly known as:  BD ULTRA-FINE MICRO PEN NEEDLE  
1 Box by Does Not Apply route two (2) times a day. lancets 32 gauge Misc  
100 Each by Does Not Apply route two (2) times daily (with meals). metFORMIN 500 mg tablet Commonly known as:  GLUCOPHAGE Take 1 Tab by mouth two (2) times daily (with meals). potassium chloride 10 mEq tablet Commonly known as:  KLOR-CON Take 1 Tab by mouth daily. Prescriptions Sent to Pharmacy Refills  
 metFORMIN (GLUCOPHAGE) 500 mg tablet 0 Sig: Take 1 Tab by mouth two (2) times daily (with meals). Class: Normal  
 Pharmacy: [address] [place] [address] [facility] Ph #: [phone] Route: Oral  
 insulin glargine (LANTUS,BASAGLAR) 100 unit/mL (3 mL) inpn 0 Sig: 10 units nightly  Indications: type 2 diabetes mellitus Class: Normal  
 Pharmacy: [address] [place] [address] [facility] Ph #: [phone]  Insulin Needles, Disposable, (BD ULTRA-FINE MICRO PEN NEEDLE) 32 gauge x 1/4\" ndle 0  
 Si Box by Does Not Apply route two (2) times a day. Class: Normal  
 Pharmacy: [address] [facility] Ph #: [phone] Route: Does Not Apply  
 dulaglutide (TRULICITY) 3.27 WB/5.2 mL sub-q pen 0 Si.5 mL by SubCUTAneous route every seven (7) days. Indications: type 2 diabetes mellitus Class: Normal  
 Pharmacy: [address] [facility] Ph #: [phone] Route: SubCUTAneous Blood-Glucose Meter monitoring kit 0 Sig: Use once in the morning for fasting blood glucose and once two hours after a meal  
 Class: Normal  
 Pharmacy: [facility] Ph #: [phone]  
 glucose blood VI test strips (BLOOD GLUCOSE TEST) strip 0 Sig: Use once in the morning for fasting blood glucose and once two hours after a meal  
 Class: Normal  
 Pharmacy: [address] [facility] Ph #: [phone]  
 lancets 32 gauge misc 0 Si Each by Does Not Apply route two (2) times daily (with meals). Class: Normal  
 Pharmacy: [address] [facility] Ph #: [phone] Route: Does Not Apply We Performed the Following REFERRAL TO DIABETIC EDUCATION [REF20 Custom] Comments: For diet education and blood sugar check training. REFERRAL TO ENDOCRINOLOGY [ISM04 Custom] Comments:  
 Please evaluate and treat patient for - new diabetic A1.C 12.5 Follow-up Instructions Return in about 1 week (around 2018), or if symptoms worsen or fail to improve, for 45 min follow up new DM. Referral Information Referral ID Referred By Referred To  
  
 [id number] Jeison Lara B Not Available Visits Status Start Date End Date 1 New Request 4/10/18 4/10/19 If your referral has a status of pending review or denied, additional information will be sent to support the outcome of this decision. Referral ID Referred By Referred To  
 [id number] [facility] [address] [place], [address] [address] Phone: [phone] Fax: [phone] Visits Status Start Date End Date 1 New Request 4/10/18 4/10/19 If your referral has a status of pending review or denied, additional information will be sent to support the outcome of this decision. Patient Instructions Please contact our office if you have any questions about your visit today. Learning About Diabetes Food Guidelines Your Care Instructions Meal planning is important to manage diabetes. It helps keep your blood sugar at a target level (which you set with your doctor). You don't have to eat special foods. You can eat what your family eats, including sweets once in a while. But you do have to pay attention to how often you eat and how much you eat of certain foods. You may want to work with a dietitian or a certified diabetes educator (CDE) to help you plan meals and snacks. A dietitian or CDE can also help you lose weight if that is one of your goals. What should you know about eating carbs? Managing the amount of carbohydrate (carbs) you eat is an important part of healthy meals when you have diabetes. Carbohydrate is found in many foods. · Learn which foods have carbs. And learn the amounts of carbs in different foods. ¨ Bread, cereal, pasta, and rice have about 15 grams of carbs in a serving. A serving is 1 slice of bread (1 ounce), ½ cup of cooked cereal, or 1/3 cup of cooked pasta or rice. ¨ Fruits have 15 grams of carbs in a serving. A serving is 1 small fresh fruit, such as an apple or orange; ½ of a banana; ½ cup of cooked or canned fruit; ½ cup of fruit juice; 1 cup of melon or raspberries; or 2 tablespoons of dried fruit. ¨ Milk and no-sugar-added yogurt have 15 grams of carbs in a serving. A serving is 1 cup of milk or 2/3 cup of no-sugar-added yogurt. ¨ Starchy vegetables have 15 grams of carbs in a serving. A serving is ½ cup of mashed potatoes or sweet potato; 1 cup winter squash; ½ of a small baked potato; ½ cup of cooked beans; or ½ cup cooked corn or green peas. · Learn how much carbs to eat each day and at each meal. A dietitian or CDE can teach you how to keep track of the amount of carbs you eat. This is called carbohydrate counting. · If you are not sure how to count carbohydrate grams, use the Plate Method to plan meals. It is a good, quick way to make sure that you have a balanced meal. It also helps you spread carbs throughout the day. ¨ Divide your plate by types of foods. Put non-starchy vegetables on half the plate, meat or other protein food on one-quarter of the plate, and a grain or starchy vegetable in the final quarter of the plate. To this you can add a small piece of fruit and 1 cup of milk or yogurt, depending on how many carbs you are supposed to eat at a meal. 
· Try to eat about the same amount of carbs at each meal. Do not \"save up\" your daily allowance of carbs to eat at one meal. 
· Proteins have very little or no carbs per serving. Examples of proteins are beef, chicken, turkey, fish, eggs, tofu, cheese, cottage cheese, and peanut butter. A serving size of meat is 3 ounces, which is about the size of a deck of cards. Examples of meat substitute serving sizes (equal to 1 ounce of meat) are 1/4 cup of cottage cheese, 1 egg, 1 tablespoon of peanut butter, and ½ cup of tofu. How can you eat out and still eat healthy? · Learn to estimate the serving sizes of foods that have carbohydrate. If you measure food at home, it will be easier to estimate the amount in a serving of restaurant food. · If the meal you order has too much carbohydrate (such as potatoes, corn, or baked beans), ask to have a low-carbohydrate food instead. Ask for a salad or green vegetables.  
· If you use insulin, check your blood sugar before and after eating out to help you plan how much to eat in the future. · If you eat more carbohydrate at a meal than you had planned, take a walk or do other exercise. This will help lower your blood sugar. What else should you know? · Limit saturated fat, such as the fat from meat and dairy products. This is a healthy choice because people who have diabetes are at higher risk of heart disease. So choose lean cuts of meat and nonfat or low-fat dairy products. Use olive or canola oil instead of butter or shortening when cooking. · Don't skip meals. Your blood sugar may drop too low if you skip meals and take insulin or certain medicines for diabetes. · Check with your doctor before you drink alcohol. Alcohol can cause your blood sugar to drop too low. Alcohol can also cause a bad reaction if you take certain diabetes medicines. Follow-up care is a key part of your treatment and safety. Be sure to make and go to all appointments, and call your doctor if you are having problems. It's also a good idea to know your test results and keep a list of the medicines you take. Where can you learn more? Go to http://michelleEdvivooleg.info/. Enter O970 in the search box to learn more about \"Learning About Diabetes Food Guidelines. \" Current as of: March 13, 2017 Content Version: 11.4 © 3907-7053 Healthwise, Incorporated. Care instructions adapted under license by [employer] (which disclaims liability or warranty for this information). If you have questions about a medical condition or this instruction, always ask your healthcare professional. [place] [address] any warranty or liability for your use of this information. Diabetes Blood Sugar Emergencies: Your Action Plan How can you prevent a blood sugar emergency? An important part of living with diabetes is keeping your blood sugar in your target range.  You'll need to know what to do if it's too high or too low. Managing your blood sugar levels helps you avoid emergencies. This care sheet will teach you about the signs of high and low blood sugar. It will help you make an action plan with your doctor for when these signs occur. Low blood sugar is more likely to happen if you take certain medicines for diabetes. It can also happen if you skip a meal, drink alcohol, or exercise more than usual. 
You may get high blood sugar if you eat differently than you normally do. One example is eating more carbohydrate than usual. Having a cold, the flu, or other sudden illness can also cause high blood sugar levels. Levels can also rise if you miss a dose of medicine. Any change in how you take your medicine may affect your blood sugar level. So it's important to work with your doctor before you make any changes. Check your blood sugar Work with your doctor to fill in the blank spaces below that apply to you. Track your levels, know your target range, and write down ways you can get your blood sugar back in your target range. A log book can help you track your levels. Take the book to all of your medical appointments. · Check your blood sugar _____ times a day, at these times:________________________________________________. (For example: Before meals, at bedtime, before exercise, during exercise, other.) · Your blood sugar target range before a meal is ___________________. Your blood sugar target range after a meal is _______________________. · Do rxkp-___________________________________________________-xr get your blood sugar back within your safe range if your blood sugar results are _________________________________________. (For example: Less than 70 or above 250 mg/dL.) Call your doctor when your blood sugar results are ___________________________________. (For example: Less than 70 or above 250 mg/dL.) What are the symptoms of low and high blood sugar? Common symptoms of low blood sugar are sweating and feeling shaky, weak, hungry, or confused. Symptoms can start quickly. Common symptoms of high blood sugar are feeling very thirsty or very hungry. You may also pass urine more often than usual. You may have blurry vision and may lose weight without trying. But some people may have high or low blood sugar without having any symptoms. That's a good reason to check your blood sugar on a regular schedule. What should you do if you have symptoms? Work with your doctor to fill in the blank spaces below that apply to you. Low blood sugar If you have symptoms of low blood sugar, check your blood sugar. If it's below _____ ( for example, below 70), eat or drink a quick-sugar food that has about 15 grams of carbohydrate. Your goal is to get your level back to your safe range. Check your blood sugar again 15 minutes later. If it's still not in your target range, take another 15 grams of carbohydrate and check your blood sugar again in 15 minutes. Repeat this until you reach your target. Then go back to your regular testing schedule. When you have low blood sugar, it's best to stop or reduce any physical activity until your blood sugar is back in your target range and is stable. If you must stay active, eat or drink 30 grams of carbohydrate. Then check your blood sugar again in 15 minutes. If it's not in your target range, take another 30 grams of carbohydrates. Check your blood sugar again in 15 minutes. Keep doing this until you reach your target. You can then go back to your regular testing schedule. If your symptoms or blood sugar levels are getting worse or have not improved after 15 minutes, seek medical care right away. Here are some examples of quick-sugar foods with 15 grams of carbohydrate: · 3 or 4 glucose tablets · 1 tube of glucose gel · Hard candy (such as 3 Jolly Ranchers or 5 to [employer]) · ½ cup to ¾ cup (4 to 6 ounces) of fruit juice or regular (not diet) soda High blood sugar If you have symptoms of high blood sugar, check your blood sugar. Your goal is to get your level back to your target range. If it's above ______ ( for example, above 250), follow these steps: · If you missed a dose of your diabetes medicine, take it now. Take only the amount of medicine that you have been prescribed. Do not take more or less medicine. · Give yourself insulin if your doctor has prescribed it for high blood sugar. · Test for ketones, if the doctor told you to do so. If the results of the ketone test show a moderate-to-large amount of ketones, call the doctor for advice. · Wait 30 minutes after you take the extra insulin or the missed medicine. Check your blood sugar again. If your symptoms or blood sugar levels are getting worse or have not improved after taking these steps, seek medical care right away. Follow-up care is a key part of your treatment and safety. Be sure to make and go to all appointments, and call your doctor if you are having problems. It's also a good idea to know your test results and keep a list of the medicines you take. Where can you learn more? Go to http://michelle-oleg.info/. Enter T232 in the search box to learn more about \"Diabetes Blood Sugar Emergencies: Your Action Plan. \" Current as of: March 13, 2017 Content Version: 11.4 © 5216-2480 Healthwise, Incorporated. Care instructions adapted under license by [employer] (which disclaims liability or warranty for this information). If you have questions about a medical condition or this instruction, always ask your healthcare professional. [place] [address] any warranty or liability for your use of this information. Learning About Meal Planning for Diabetes Why plan your meals? Meal planning can be a key part of managing diabetes.  Planning meals and snacks with the right balance of carbohydrate, protein, and fat can help you keep your blood sugar at the target level you set with your doctor. You don't have to eat special foods. You can eat what your family eats, including sweets once in a while. But you do have to pay attention to how often you eat and how much you eat of certain foods. You may want to work with a dietitian or a certified diabetes educator. He or she can give you tips and meal ideas and can answer your questions about meal planning. This health professional can also help you reach a healthy weight if that is one of your goals. What plan is right for you? Your dietitian or diabetes educator may suggest that you start with the plate format or carbohydrate counting. The plate format The plate format is a simple way to help you manage how you eat. You plan meals by learning how much space each food should take on a plate. Using the plate format helps you spread carbohydrate throughout the day. It can make it easier to keep your blood sugar level within your target range. It also helps you see if you're eating healthy portion sizes. To use the plate format, you put non-starchy vegetables on half your plate. Add meat or meat substitutes on one-quarter of the plate. Put a grain or starchy vegetable (such as brown rice or a potato) on the final quarter of the plate. You can add a small piece of fruit and some low-fat or fat-free milk or yogurt, depending on your carbohydrate goal for each meal. 
Here are some tips for using the plate format: · Make sure that you are not using an oversized plate. A 9-inch plate is best. Many restaurants use larger plates. · Get used to using the plate format at home. Then you can use it when you eat out. · Write down your questions about using the plate format. Talk to your doctor, a dietitian, or a diabetes educator about your concerns. Carbohydrate counting With carbohydrate counting, you plan meals based on the amount of carbohydrate in each food. Carbohydrate raises blood sugar higher and more quickly than any other nutrient. It is found in desserts, breads and cereals, and fruit. It's also found in starchy vegetables such as potatoes and corn, grains such as rice and pasta, and milk and yogurt. Spreading carbohydrate throughout the day helps keep your blood sugar levels within your target range. Your daily amount depends on several things, including your weight, how active you are, which diabetes medicines you take, and what your goals are for your blood sugar levels. A registered dietitian or diabetes educator can help you plan how much carbohydrate to include in each meal and snack. A guideline for your daily amount of carbohydrate is: · 45 to 60 grams at each meal. That's about the same as 3 to 4 carbohydrate servings. · 15 to 20 grams at each snack. That's about the same as 1 carbohydrate serving. The Nutrition Facts label on packaged foods tells you how much carbohydrate is in a serving of the food. First, look at the serving size on the food label. Is that the amount you eat in a serving? All of the nutrition information on a food label is based on that serving size. So if you eat more or less than that, you'll need to adjust the other numbers. Total carbohydrate is the next thing you need to look for on the label. If you count carbohydrate servings, one serving of carbohydrate is 15 grams. For foods that don't come with labels, such as fresh fruits and vegetables, you'll need a guide that lists carbohydrate in these foods. Ask your doctor, dietitian, or diabetes educator about books or other nutrition guides you can use. If you take insulin, you need to know how many grams of carbohydrate are in a meal. This lets you know how much rapid-acting insulin to take before you eat.  If you use an insulin pump, you get a constant rate of insulin during the day. So the pump must be programmed at meals to give you extra insulin to cover the rise in blood sugar after meals. When you know how much carbohydrate you will eat, you can take the right amount of insulin. Or, if you always use the same amount of insulin, you need to make sure that you eat the same amount of carbohydrate at meals. If you need more help to understand carbohydrate counting and food labels, ask your doctor, dietitian, or diabetes educator. How do you get started with meal planning? Here are some tips to get started: 
· Plan your meals a week at a time. Don't forget to include snacks too. · Use cookbooks or online recipes to plan several main meals. Plan some quick meals for busy nights. You also can double some recipes that freeze well. Then you can save half for other busy nights when you don't have time to cook. · Make sure you have the ingredients you need for your recipes. If you're running low on basic items, put these items on your shopping list too. · List foods that you use to make breakfasts, lunches, and snacks. List plenty of fruits and vegetables. · Post this list on the refrigerator. Add to it as you think of more things you need. · Take the list to the store to do your weekly shopping. Follow-up care is a key part of your treatment and safety. Be sure to make and go to all appointments, and call your doctor if you are having problems. It's also a good idea to know your test results and keep a list of the medicines you take. Where can you learn more? Go to http://michelle-oleg.info/. Randall Butler in the search box to learn more about \"Learning About Meal Planning for Diabetes. \" Current as of: March 13, 2017 Content Version: 11.4 © 8161-0657 Healthwise, Incorporated. Care instructions adapted under license by [employer] (which disclaims liability or warranty for this information).  If you have questions about a medical condition or this instruction, always ask your healthcare professional. [address] any warranty or liability for your use of this information. Counting Carbohydrates When You Take Insulin: Care Instructions Your Care Instructions You don't have to eat special foods when you take insulin. You just have to be careful to eat healthy foods. And you have to spread throughout the day the carbohydrates you eat. Carbohydrates raise blood sugar higher and more quickly than any other nutrient. It is found in desserts, breads and cereals, and fruit. It's also found in starchy vegetables such as potatoes and corn, grains such as rice and pasta, and milk and yogurt. The more carbohydrates, or carbs, you eat at one time, the higher your blood sugar will rise. Spreading carbs throughout the day helps keep your blood sugar levels within your target range. Counting carbs is one of the best ways to keep your blood sugar under control when you use insulin. It helps you match the right amount of insulin to the number of grams of carbohydrates in a meal. You need to test your blood sugar several times a day to learn how carbs affect you. Then you can change your diet and insulin dose as needed. A registered dietitian or certified diabetes educator can help you plan meals and snacks. Follow-up care is a key part of your treatment and safety. Be sure to make and go to all appointments, and call your doctor if you are having problems. It's also a good idea to know your test results and keep a list of the medicines you take. How can you care for yourself at home? Know your daily amount of carbohydrates Your daily amount depends on several things, including your weight, how active you are, which diabetes medicines you take, and what your goals are for your blood sugar levels. A registered dietitian or certified diabetes educator can help you plan how many carbohydrates to include in each meal and snack. For most adults, a guideline for the daily amount of carbohydrates is: · 45 to 60 grams at each meal. That's about the same as 3 to 4 carbohydrate servings. · 15 to 20 grams at each snack. That's about the same as 1 carbohydrate serving. Count carbs If you take insulin, you need to know how many grams of carbohydrates are in a meal. This lets you know how much rapid-acting insulin to take before you eat. If you use an insulin pump, you get a constant rate of insulin during the day. So the pump must be programmed at meals to give you extra insulin to cover the rise in blood sugar after meals. When you know how many carbohydrates you will eat, you can take the right amount of insulin. Or, if you always use the same amount of insulin, you need to make sure that you eat the same amount of carbs at meals. · Learn your own insulin-to-carbohydrates ratio. You and your diabetes health professional will figure out the ratio. You can do this by testing your blood sugar after meals. For example, you may need a certain amount of insulin for every 15 grams of carbohydrates. · Add up the carbohydrate grams in a meal. Then you can figure out how many units of insulin to take based on your insulin-to-carbohydrates ratio. · Look at labels on packaged foods. This can tell you how many carbohydrates are in a serving. You can also use guides from the American [employer] Association. · Be aware of portions, or serving sizes. If a package has two servings and you eat the whole package, you need to double the number of grams of carbohydrates listed for one serving. · Protein, fat, and fiber do not raise blood sugar as much as carbs do. If you eat a lot of these nutrients in a meal, your blood sugar will rise more slowly than it would otherwise. · Exercise lowers blood sugar. You can use less insulin than you would if you were not doing exercise. Keep in mind that timing matters.  If you exercise within 1 hour after a meal, your body may need less insulin for that meal than it would if you exercised 3 hours after the meal. Test your blood sugar to find out how exercise affects your need for insulin. Eat from all food groups · Eat at least three meals a day. · Plan meals to include food from all the food groups. ¨ Grains: 1 slice of bread (1 ounce), ½ cup of cooked cereal, and 1/3 cup of cooked pasta or rice. These have about 15 grams of carbohydrates in a serving. Choose whole grains. These include whole wheat bread or crackers, oatmeal, and brown rice. Have them more often than refined grains. ¨ Fruit: 1 small fresh fruit, such as an apple or orange; ½ of a banana; ½ cup of chopped, cooked, or canned fruit; ½ cup of fruit juice; 1 cup of melon or raspberries; and 2 tablespoons of dried fruit. These have about 15 grams of carbohydrates in a serving. ¨ Dairy: 1 cup of nonfat or low-fat milk and 2/3 cup of plain yogurt. These have about 15 grams of carbohydrates in a serving. ¨ Protein foods: Beef, chicken, turkey, fish, eggs, tofu, cheese, cottage cheese, and peanut butter. A serving size of meat is 3 ounces. This is about the size of a deck of cards. Examples of meat substitute serving sizes (equal to 1 ounce of meat) are 1/4 cup of cottage cheese, 1 egg, 1 tablespoon of peanut butter, and ½ cup of tofu. These have very little or no carbohydrates in a serving. ¨ Vegetables: Starchy vegetables such as ½ cup of cooked beans, peas, potatoes, or corn have about 15 grams of carbohydrates. Nonstarchy vegetables have very little carbohydrates. These include 1 cup of raw leafy vegetables (such as spinach), ½ cup of other vegetables (cooked or chopped), and 3/4 cup of vegetable juice. · Talk to your dietitian or diabetes educator about ways to add limited amounts of sweets into your meal plan. · If you drink alcohol: ¨ Limit it to no more than 1 drink a day for women and 2 drinks a day for men. (One drink is 12 fl oz of beer, 5 fl oz of wine, or 1.5 fl oz liquor.) ¨ Make sure to count drink mixers that have sugar in your total carbohydrate count. These include cola, tonic water, maribeth mix, and fruit juice. ¨ Eat a carbohydrate food along with your alcoholic drink. ¨ Check your blood sugar more often. This is because alcohol can lower your blood sugar too much. This may happen even hours later while you sleep. You may want to eat and adjust your insulin dose when you drink alcohol to prevent severe low blood sugar. ¨ Talk to your doctor. Alcohol may not be recommended when you are taking certain diabetes medicines. Where can you learn more? Go to http://michelle-oleg.info/. Enter R633 in the search box to learn more about \"Counting Carbohydrates When You Take Insulin: Care Instructions. \" Current as of: March 13, 2017 Content Version: 11.4 © 5318-2742 [employer]. Care instructions adapted under license by [employer] (which disclaims liability or warranty for this information). If you have questions about a medical condition or this instruction, always ask your healthcare professional. [place] [address] any warranty or liability for your use of this information. Introducing [facility]! [employer] introduces [employer] patient portal. Now you can access parts of your medical record, email your doctor's office, and request medication refills online. 1. In your internet browser, go to [email or web address] 2. Click on the First Time User? Click Here link in the Sign In box. You will see the New Member Sign Up page. 3. Enter your [employer] Access Code exactly as it appears below. You will not need to use this code after youve completed the sign-up process. If you do not sign up before the expiration date, you must request a new code. · [employer] Access Code: [email or web address] Expires: 7/1/2018  9:27 AM 
 
4. Enter the last four digits of your Social Security Number (xxxx) and Date of Birth (mm/dd/yyyy) as indicated and click Submit. You will be taken to the next sign-up page. 5. Create a JustFab ID. This will be your Just[employer] login ID and cannot be changed, so think of one that is secure and easy to remember. 6. Create a Just[employer] password. You can change your password at any time. 7. Enter your Password Reset Question and Answer. This can be used at a later time if you forget your password. 8. Enter your e-mail address. You will receive e-mail notification when new information is available in [address] 9. Click Sign Up. You can now view and download portions of your medical record. 10. Click the Download Summary menu link to download a portable copy of your medical information. If you have questions, please visit the Frequently Asked Questions section of the Just[employer] website. Remember, Just[employer] is NOT to be used for urgent needs. For medical emergencies, dial 911. Now available from your iPhone and Android! Please provide this summary of care documentation to your next provider. Your primary care clinician is listed as Andrew Salinas. If you have any questions after today's visit, please call [phone].

## 2018-04-10 NOTE — PATIENT INSTRUCTIONS
Please contact our office if you have any questions about your visit today. Learning About Diabetes Food Guidelines  Your Care Instructions    Meal planning is important to manage diabetes. It helps keep your blood sugar at a target level (which you set with your doctor). You don't have to eat special foods. You can eat what your family eats, including sweets once in a while. But you do have to pay attention to how often you eat and how much you eat of certain foods. You may want to work with a dietitian or a certified diabetes educator (CDE) to help you plan meals and snacks. A dietitian or CDE can also help you lose weight if that is one of your goals. What should you know about eating carbs? Managing the amount of carbohydrate (carbs) you eat is an important part of healthy meals when you have diabetes. Carbohydrate is found in many foods. · Learn which foods have carbs. And learn the amounts of carbs in different foods. ¨ Bread, cereal, pasta, and rice have about 15 grams of carbs in a serving. A serving is 1 slice of bread (1 ounce), ½ cup of cooked cereal, or 1/3 cup of cooked pasta or rice. ¨ Fruits have 15 grams of carbs in a serving. A serving is 1 small fresh fruit, such as an apple or orange; ½ of a banana; ½ cup of cooked or canned fruit; ½ cup of fruit juice; 1 cup of melon or raspberries; or 2 tablespoons of dried fruit. ¨ Milk and no-sugar-added yogurt have 15 grams of carbs in a serving. A serving is 1 cup of milk or 2/3 cup of no-sugar-added yogurt. ¨ Starchy vegetables have 15 grams of carbs in a serving. A serving is ½ cup of mashed potatoes or sweet potato; 1 cup winter squash; ½ of a small baked potato; ½ cup of cooked beans; or ½ cup cooked corn or green peas. · Learn how much carbs to eat each day and at each meal. A dietitian or CDE can teach you how to keep track of the amount of carbs you eat. This is called carbohydrate counting.   · If you are not sure how to count carbohydrate grams, use the Plate Method to plan meals. It is a good, quick way to make sure that you have a balanced meal. It also helps you spread carbs throughout the day. ¨ Divide your plate by types of foods. Put non-starchy vegetables on half the plate, meat or other protein food on one-quarter of the plate, and a grain or starchy vegetable in the final quarter of the plate. To this you can add a small piece of fruit and 1 cup of milk or yogurt, depending on how many carbs you are supposed to eat at a meal.  · Try to eat about the same amount of carbs at each meal. Do not \"save up\" your daily allowance of carbs to eat at one meal.  · Proteins have very little or no carbs per serving. Examples of proteins are beef, chicken, turkey, fish, eggs, tofu, cheese, cottage cheese, and peanut butter. A serving size of meat is 3 ounces, which is about the size of a deck of cards. Examples of meat substitute serving sizes (equal to 1 ounce of meat) are 1/4 cup of cottage cheese, 1 egg, 1 tablespoon of peanut butter, and ½ cup of tofu. How can you eat out and still eat healthy? · Learn to estimate the serving sizes of foods that have carbohydrate. If you measure food at home, it will be easier to estimate the amount in a serving of restaurant food. · If the meal you order has too much carbohydrate (such as potatoes, corn, or baked beans), ask to have a low-carbohydrate food instead. Ask for a salad or green vegetables. · If you use insulin, check your blood sugar before and after eating out to help you plan how much to eat in the future. · If you eat more carbohydrate at a meal than you had planned, take a walk or do other exercise. This will help lower your blood sugar. What else should you know? · Limit saturated fat, such as the fat from meat and dairy products. This is a healthy choice because people who have diabetes are at higher risk of heart disease.  So choose lean cuts of meat and nonfat or low-fat dairy products. Use olive or canola oil instead of butter or shortening when cooking. · Don't skip meals. Your blood sugar may drop too low if you skip meals and take insulin or certain medicines for diabetes. · Check with your doctor before you drink alcohol. Alcohol can cause your blood sugar to drop too low. Alcohol can also cause a bad reaction if you take certain diabetes medicines. Follow-up care is a key part of your treatment and safety. Be sure to make and go to all appointments, and call your doctor if you are having problems. It's also a good idea to know your test results and keep a list of the medicines you take. Where can you learn more? Go to http://michelle-oleg.info/. Enter J265 in the search box to learn more about \"Learning About Diabetes Food Guidelines. \"  Current as of: March 13, 2017  Content Version: 11.4  © 9126-9492 [employer]. Care instructions adapted under license by [employer] (which disclaims liability or warranty for this information). If you have questions about a medical condition or this instruction, always ask your healthcare professional. [address] any warranty or liability for your use of this information. Diabetes Blood Sugar Emergencies: Your Action Plan  How can you prevent a blood sugar emergency? An important part of living with diabetes is keeping your blood sugar in your target range. You'll need to know what to do if it's too high or too low. Managing your blood sugar levels helps you avoid emergencies. This care sheet will teach you about the signs of high and low blood sugar. It will help you make an action plan with your doctor for when these signs occur. Low blood sugar is more likely to happen if you take certain medicines for diabetes. It can also happen if you skip a meal, drink alcohol, or exercise more than usual.  You may get high blood sugar if you eat differently than you normally do. One example is eating more carbohydrate than usual. Having a cold, the flu, or other sudden illness can also cause high blood sugar levels. Levels can also rise if you miss a dose of medicine. Any change in how you take your medicine may affect your blood sugar level. So it's important to work with your doctor before you make any changes. Check your blood sugar  Work with your doctor to fill in the blank spaces below that apply to you. Track your levels, know your target range, and write down ways you can get your blood sugar back in your target range. A log book can help you track your levels. Take the book to all of your medical appointments. · Check your blood sugar _____ times a day, at these times:________________________________________________. (For example: Before meals, at bedtime, before exercise, during exercise, other.)  · Your blood sugar target range before a meal is ___________________. Your blood sugar target range after a meal is _______________________. · Do rcbg-___________________________________________________-ej get your blood sugar back within your safe range if your blood sugar results are _________________________________________. (For example: Less than 70 or above 250 mg/dL.)  Call your doctor when your blood sugar results are ___________________________________. (For example: Less than 70 or above 250 mg/dL.)  What are the symptoms of low and high blood sugar? Common symptoms of low blood sugar are sweating and feeling shaky, weak, hungry, or confused. Symptoms can start quickly. Common symptoms of high blood sugar are feeling very thirsty or very hungry. You may also pass urine more often than usual. You may have blurry vision and may lose weight without trying. But some people may have high or low blood sugar without having any symptoms. That's a good reason to check your blood sugar on a regular schedule. What should you do if you have symptoms?   Work with your doctor to fill in the blank spaces below that apply to you. Low blood sugar  If you have symptoms of low blood sugar, check your blood sugar. If it's below _____ ( for example, below 70), eat or drink a quick-sugar food that has about 15 grams of carbohydrate. Your goal is to get your level back to your safe range. Check your blood sugar again 15 minutes later. If it's still not in your target range, take another 15 grams of carbohydrate and check your blood sugar again in 15 minutes. Repeat this until you reach your target. Then go back to your regular testing schedule. When you have low blood sugar, it's best to stop or reduce any physical activity until your blood sugar is back in your target range and is stable. If you must stay active, eat or drink 30 grams of carbohydrate. Then check your blood sugar again in 15 minutes. If it's not in your target range, take another 30 grams of carbohydrates. Check your blood sugar again in 15 minutes. Keep doing this until you reach your target. You can then go back to your regular testing schedule. If your symptoms or blood sugar levels are getting worse or have not improved after 15 minutes, seek medical care right away. Here are some examples of quick-sugar foods with 15 grams of carbohydrate:  · 3 or 4 glucose tablets  · 1 tube of glucose gel  · Hard candy (such as 3 Jolly Ranchers or 5 to 7 Life Savers)  · ½ cup to ¾ cup (4 to 6 ounces) of fruit juice or regular (not diet) soda  High blood sugar  If you have symptoms of high blood sugar, check your blood sugar. Your goal is to get your level back to your target range. If it's above ______ ( for example, above 250), follow these steps:  · If you missed a dose of your diabetes medicine, take it now. Take only the amount of medicine that you have been prescribed. Do not take more or less medicine. · Give yourself insulin if your doctor has prescribed it for high blood sugar. · Test for ketones, if the doctor told you to do so. If the results of the ketone test show a moderate-to-large amount of ketones, call the doctor for advice. · Wait 30 minutes after you take the extra insulin or the missed medicine. Check your blood sugar again. If your symptoms or blood sugar levels are getting worse or have not improved after taking these steps, seek medical care right away. Follow-up care is a key part of your treatment and safety. Be sure to make and go to all appointments, and call your doctor if you are having problems. It's also a good idea to know your test results and keep a list of the medicines you take. Where can you learn more? Go to http://michelle-oleg.info/. Enter Q011 in the search box to learn more about \"Diabetes Blood Sugar Emergencies: Your Action Plan. \"  Current as of: March 13, 2017  Content Version: 11.4  © 6325-1649 [employer]. Care instructions adapted under license by [employer] (which disclaims liability or warranty for this information). If you have questions about a medical condition or this instruction, always ask your healthcare professional. [address] any warranty or liability for your use of this information. Learning About Meal Planning for Diabetes  Why plan your meals? Meal planning can be a key part of managing diabetes. Planning meals and snacks with the right balance of carbohydrate, protein, and fat can help you keep your blood sugar at the target level you set with your doctor. You don't have to eat special foods. You can eat what your family eats, including sweets once in a while. But you do have to pay attention to how often you eat and how much you eat of certain foods. You may want to work with a dietitian or a certified diabetes educator. He or she can give you tips and meal ideas and can answer your questions about meal planning.  This health professional can also help you reach a healthy weight if that is one of your goals.  What plan is right for you? Your dietitian or diabetes educator may suggest that you start with the plate format or carbohydrate counting. The plate format  The plate format is a simple way to help you manage how you eat. You plan meals by learning how much space each food should take on a plate. Using the plate format helps you spread carbohydrate throughout the day. It can make it easier to keep your blood sugar level within your target range. It also helps you see if you're eating healthy portion sizes. To use the plate format, you put non-starchy vegetables on half your plate. Add meat or meat substitutes on one-quarter of the plate. Put a grain or starchy vegetable (such as brown rice or a potato) on the final quarter of the plate. You can add a small piece of fruit and some low-fat or fat-free milk or yogurt, depending on your carbohydrate goal for each meal.  Here are some tips for using the plate format:  · Make sure that you are not using an oversized plate. A 9-inch plate is best. Many restaurants use larger plates. · Get used to using the plate format at home. Then you can use it when you eat out. · Write down your questions about using the plate format. Talk to your doctor, a dietitian, or a diabetes educator about your concerns. Carbohydrate counting  With carbohydrate counting, you plan meals based on the amount of carbohydrate in each food. Carbohydrate raises blood sugar higher and more quickly than any other nutrient. It is found in desserts, breads and cereals, and fruit. It's also found in starchy vegetables such as potatoes and corn, grains such as rice and pasta, and milk and yogurt. Spreading carbohydrate throughout the day helps keep your blood sugar levels within your target range. Your daily amount depends on several things, including your weight, how active you are, which diabetes medicines you take, and what your goals are for your blood sugar levels.  A registered dietitian or diabetes educator can help you plan how much carbohydrate to include in each meal and snack. A guideline for your daily amount of carbohydrate is:  · 45 to 60 grams at each meal. That's about the same as 3 to 4 carbohydrate servings. · 15 to 20 grams at each snack. That's about the same as 1 carbohydrate serving. The Nutrition Facts label on packaged foods tells you how much carbohydrate is in a serving of the food. First, look at the serving size on the food label. Is that the amount you eat in a serving? All of the nutrition information on a food label is based on that serving size. So if you eat more or less than that, you'll need to adjust the other numbers. Total carbohydrate is the next thing you need to look for on the label. If you count carbohydrate servings, one serving of carbohydrate is 15 grams. For foods that don't come with labels, such as fresh fruits and vegetables, you'll need a guide that lists carbohydrate in these foods. Ask your doctor, dietitian, or diabetes educator about books or other nutrition guides you can use. If you take insulin, you need to know how many grams of carbohydrate are in a meal. This lets you know how much rapid-acting insulin to take before you eat. If you use an insulin pump, you get a constant rate of insulin during the day. So the pump must be programmed at meals to give you extra insulin to cover the rise in blood sugar after meals. When you know how much carbohydrate you will eat, you can take the right amount of insulin. Or, if you always use the same amount of insulin, you need to make sure that you eat the same amount of carbohydrate at meals. If you need more help to understand carbohydrate counting and food labels, ask your doctor, dietitian, or diabetes educator. How do you get started with meal planning? Here are some tips to get started:  · Plan your meals a week at a time. Don't forget to include snacks too.   · Use cookbooks or online recipes to plan several main meals. Plan some quick meals for busy nights. You also can double some recipes that freeze well. Then you can save half for other busy nights when you don't have time to cook. · Make sure you have the ingredients you need for your recipes. If you're running low on basic items, put these items on your shopping list too. · List foods that you use to make breakfasts, lunches, and snacks. List plenty of fruits and vegetables. · Post this list on the refrigerator. Add to it as you think of more things you need. · Take the list to the store to do your weekly shopping. Follow-up care is a key part of your treatment and safety. Be sure to make and go to all appointments, and call your doctor if you are having problems. It's also a good idea to know your test results and keep a list of the medicines you take. Where can you learn more? Go to http://michellePa-Go Mobileoleg.info/. Álvaro Carpio in the search box to learn more about \"Learning About Meal Planning for Diabetes. \"  Current as of: March 13, 2017  Content Version: 11.4  © 3725-4111 [employer]. Care instructions adapted under license by [employer] (which disclaims liability or warranty for this information). If you have questions about a medical condition or this instruction, always ask your healthcare professional. [address] any warranty or liability for your use of this information. Counting Carbohydrates When You Take Insulin: Care Instructions  Your Care Instructions    You don't have to eat special foods when you take insulin. You just have to be careful to eat healthy foods. And you have to spread throughout the day the carbohydrates you eat. Carbohydrates raise blood sugar higher and more quickly than any other nutrient. It is found in desserts, breads and cereals, and fruit.  It's also found in starchy vegetables such as potatoes and corn, grains such as rice and pasta, and milk and yogurt. The more carbohydrates, or carbs, you eat at one time, the higher your blood sugar will rise. Spreading carbs throughout the day helps keep your blood sugar levels within your target range. Counting carbs is one of the best ways to keep your blood sugar under control when you use insulin. It helps you match the right amount of insulin to the number of grams of carbohydrates in a meal. You need to test your blood sugar several times a day to learn how carbs affect you. Then you can change your diet and insulin dose as needed. A registered dietitian or certified diabetes educator can help you plan meals and snacks. Follow-up care is a key part of your treatment and safety. Be sure to make and go to all appointments, and call your doctor if you are having problems. It's also a good idea to know your test results and keep a list of the medicines you take. How can you care for yourself at home? Know your daily amount of carbohydrates  Your daily amount depends on several things, including your weight, how active you are, which diabetes medicines you take, and what your goals are for your blood sugar levels. A registered dietitian or certified diabetes educator can help you plan how many carbohydrates to include in each meal and snack. For most adults, a guideline for the daily amount of carbohydrates is:  · 45 to 60 grams at each meal. That's about the same as 3 to 4 carbohydrate servings. · 15 to 20 grams at each snack. That's about the same as 1 carbohydrate serving. Count carbs  If you take insulin, you need to know how many grams of carbohydrates are in a meal. This lets you know how much rapid-acting insulin to take before you eat. If you use an insulin pump, you get a constant rate of insulin during the day. So the pump must be programmed at meals to give you extra insulin to cover the rise in blood sugar after meals.   When you know how many carbohydrates you will eat, you can take the right amount of insulin. Or, if you always use the same amount of insulin, you need to make sure that you eat the same amount of carbs at meals. · Learn your own insulin-to-carbohydrates ratio. You and your diabetes health professional will figure out the ratio. You can do this by testing your blood sugar after meals. For example, you may need a certain amount of insulin for every 15 grams of carbohydrates. · Add up the carbohydrate grams in a meal. Then you can figure out how many units of insulin to take based on your insulin-to-carbohydrates ratio. · Look at labels on packaged foods. This can tell you how many carbohydrates are in a serving. You can also use guides from the American Diabetes Association. · Be aware of portions, or serving sizes. If a package has two servings and you eat the whole package, you need to double the number of grams of carbohydrates listed for one serving. · Protein, fat, and fiber do not raise blood sugar as much as carbs do. If you eat a lot of these nutrients in a meal, your blood sugar will rise more slowly than it would otherwise. · Exercise lowers blood sugar. You can use less insulin than you would if you were not doing exercise. Keep in mind that timing matters. If you exercise within 1 hour after a meal, your body may need less insulin for that meal than it would if you exercised 3 hours after the meal. Test your blood sugar to find out how exercise affects your need for insulin. Eat from all food groups  · Eat at least three meals a day. · Plan meals to include food from all the food groups. ¨ Grains: 1 slice of bread (1 ounce), ½ cup of cooked cereal, and 1/3 cup of cooked pasta or rice. These have about 15 grams of carbohydrates in a serving. Choose whole grains. These include whole wheat bread or crackers, oatmeal, and brown rice. Have them more often than refined grains.   ¨ Fruit: 1 small fresh fruit, such as an apple or orange; ½ of a banana; ½ cup of chopped, cooked, or canned fruit; ½ cup of fruit juice; 1 cup of melon or raspberries; and 2 tablespoons of dried fruit. These have about 15 grams of carbohydrates in a serving. ¨ Dairy: 1 cup of nonfat or low-fat milk and 2/3 cup of plain yogurt. These have about 15 grams of carbohydrates in a serving. ¨ Protein foods: Beef, chicken, turkey, fish, eggs, tofu, cheese, cottage cheese, and peanut butter. A serving size of meat is 3 ounces. This is about the size of a deck of cards. Examples of meat substitute serving sizes (equal to 1 ounce of meat) are 1/4 cup of cottage cheese, 1 egg, 1 tablespoon of peanut butter, and ½ cup of tofu. These have very little or no carbohydrates in a serving. ¨ Vegetables: Starchy vegetables such as ½ cup of cooked beans, peas, potatoes, or corn have about 15 grams of carbohydrates. Nonstarchy vegetables have very little carbohydrates. These include 1 cup of raw leafy vegetables (such as spinach), ½ cup of other vegetables (cooked or chopped), and 3/4 cup of vegetable juice. · Talk to your dietitian or diabetes educator about ways to add limited amounts of sweets into your meal plan. · If you drink alcohol:  ¨ Limit it to no more than 1 drink a day for women and 2 drinks a day for men. (One drink is 12 fl oz of beer, 5 fl oz of wine, or 1.5 fl oz liquor.)  ¨ Make sure to count drink mixers that have sugar in your total carbohydrate count. These include cola, tonic water, maribeth mix, and fruit juice. ¨ Eat a carbohydrate food along with your alcoholic drink. ¨ Check your blood sugar more often. This is because alcohol can lower your blood sugar too much. This may happen even hours later while you sleep. You may want to eat and adjust your insulin dose when you drink alcohol to prevent severe low blood sugar. ¨ Talk to your doctor. Alcohol may not be recommended when you are taking certain diabetes medicines. Where can you learn more?   Go to http://michelle-oleg.info/. Enter H987 in the search box to learn more about \"Counting Carbohydrates When You Take Insulin: Care Instructions. \"  Current as of: March 13, 2017  Content Version: 11.4  © 5171-8454 [employer]. Care instructions adapted under license by [employer] (which disclaims liability or warranty for this information). If you have questions about a medical condition or this instruction, always ask your healthcare professional. [address] any warranty or liability for your use of this information.

## 2018-04-10 NOTE — PROGRESS NOTES
JULEE Shirley is a 32 y.o. female  Chief Complaint   Patient presents with    Hypertension     Patient has been monitoring her BP at home around 9 AM and takes her BP medication at 0700 in the AM everyday.  Labs     Patient had last drawn at last visit. Patient labs to be discussed during this visit     Reports her blood pressure has been going down. Denies chest pain and or shortness of breath. Denies dizziness or blurred vision. Requesting lab results    Past Medical History  No past medical history on file. Surgical History  No past surgical history on file. Medications  Current Outpatient Prescriptions   Medication Sig Dispense Refill    metFORMIN (GLUCOPHAGE) 500 mg tablet Take 1 Tab by mouth two (2) times daily (with meals). 60 Tab 0    insulin glargine (LANTUS,BASAGLAR) 100 unit/mL (3 mL) inpn 10 units nightly  Indications: type 2 diabetes mellitus 3 Pen 0    Insulin Needles, Disposable, (BD ULTRA-FINE MICRO PEN NEEDLE) 32 gauge x 1/4\" ndle 1 Box by Does Not Apply route two (2) times a day. 100 Pen Needle 0    dulaglutide (TRULICITY) 9.70 FL/2.9 mL sub-q pen 0.5 mL by SubCUTAneous route every seven (7) days. Indications: type 2 diabetes mellitus 4 Pen 0    Blood-Glucose Meter monitoring kit Use once in the morning for fasting blood glucose and once two hours after a meal 1 Kit 0    glucose blood VI test strips (BLOOD GLUCOSE TEST) strip Use once in the morning for fasting blood glucose and once two hours after a meal 100 Strip 0    lancets 32 gauge misc 100 Each by Does Not Apply route two (2) times daily (with meals). 100 Lancet 0    amLODIPine (NORVASC) 5 mg tablet Take 1 Tab by mouth daily. 30 Tab 0    hydroCHLOROthiazide (HYDRODIURIL) 25 mg tablet Take 0.5 Tabs by mouth daily. 30 Tab 0    potassium chloride (KLOR-CON) 10 mEq tablet Take 1 Tab by mouth daily. 30 Tab 0    ergocalciferol (ERGOCALCIFEROL) 50,000 unit capsule Take 1 Cap by mouth every seven (7) days.  12 Cap 3 Allergies  No Known Allergies    Family History  Family History   Problem Relation Age of Onset    Hypertension Mother     Hypertension Father     Hypertension Paternal Grandmother     Hypertension Paternal Grandfather        Social History  Social History     Social History    Marital status: SINGLE     Spouse name: N/A    Number of children: N/A    Years of education: N/A     Occupational History    Not on file. Social History Main Topics    Smoking status: Former Smoker     Start date: 4/2/2008     Quit date: 6/2/2013    Smokeless tobacco: Never Used    Alcohol use 1.2 - 1.8 oz/week     1 - 2 Glasses of wine, 0 Cans of beer, 1 Shots of liquor, 0 Standard drinks or equivalent per week    Drug use: No    Sexual activity: Yes     Partners: Male     Birth control/ protection: Condom     Other Topics Concern    Not on file     Social History Narrative    No narrative on file       Problem List  Patient Active Problem List   Diagnosis Code    Controlled diabetes mellitus type 2 with complications ([facility] [address] F03.8    Mixed hyperlipidemia E78.2    Essential hypertension I10    Vitamin D deficiency E55.9    Severe obesity (BMI 35.0-39. 9) with comorbidity ([facility] [address] E66.01       Review of Systems  Review of Systems   Constitutional: Negative for chills and fever. Eyes: Negative for blurred vision. Respiratory: Negative for shortness of breath. Cardiovascular: Negative for chest pain. Gastrointestinal: Negative for abdominal pain, blood in stool, nausea and vomiting. Genitourinary: Negative for frequency and urgency. Skin: Negative for rash. Neurological: Negative for dizziness. Endo/Heme/Allergies: Negative for polydipsia.        Vital Signs  Vitals:    04/10/18 0904 04/10/18 0909   BP: (!) 161/93 120/90   Pulse: (!) 134    Resp: 18    Temp: 99.3 °F (37.4 °C)    TempSrc: Oral    SpO2: 100%    Weight: 199 lb (90.3 kg)    Height: 5' 1\" (1.549 m)    PainSc:   0 - No pain    LMP: 03/14/2018 Physical Exam  Physical Exam   Constitutional: She is oriented to person, place, and time. HENT:   Nose: Nose normal.   Mouth/Throat: Oropharynx is clear and moist.   Eyes: Pupils are equal, round, and reactive to light. Neck: Normal range of motion. Cardiovascular: Regular rhythm and normal heart sounds. Tachycardia present. No murmur heard. Pulmonary/Chest: Effort normal and breath sounds normal. No respiratory distress. She has no wheezes. Abdominal: Soft. Bowel sounds are normal. She exhibits no distension. There is no tenderness. Neurological: She is alert and oriented to person, place, and time. No cranial nerve deficit. Coordination normal.   Psychiatric: She has a normal mood and affect. Her behavior is normal.   Vitals reviewed. Diagnostics  Orders Placed This Encounter    REFERRAL TO DIABETIC EDUCATION     Referral Priority:   Routine     Referral Type:   Consultation     Referral Reason:   Specialty Services Required    REFERRAL TO ENDOCRINOLOGY     Referral Priority:   Routine     Referral Type:   Consultation     Referral Reason:   Specialty Services Required     [facility]     Referred to Provider:   Rosalita Holter, MD     Requested Specialty:   Endocrinology    metFORMIN (GLUCOPHAGE) 500 mg tablet     Sig: Take 1 Tab by mouth two (2) times daily (with meals). Dispense:  60 Tab     Refill:  0    insulin glargine (LANTUS,BASAGLAR) 100 unit/mL (3 mL) inpn     Sig: 10 units nightly  Indications: type 2 diabetes mellitus     Dispense:  3 Pen     Refill:  0    Insulin Needles, Disposable, (BD ULTRA-FINE MICRO PEN NEEDLE) 32 gauge x 1/4\" ndle     Si Box by Does Not Apply route two (2) times a day. Dispense:  100 Pen Needle     Refill:  0    dulaglutide (TRULICITY) 7.14 FG/2.3 mL sub-q pen     Si.5 mL by SubCUTAneous route every seven (7) days.  Indications: type 2 diabetes mellitus     Dispense:  4 Pen     Refill:  0  Blood-Glucose Meter monitoring kit     Sig: Use once in the morning for fasting blood glucose and once two hours after a meal     Dispense:  1 Kit     Refill:  0    glucose blood VI test strips (BLOOD GLUCOSE TEST) strip     Sig: Use once in the morning for fasting blood glucose and once two hours after a meal     Dispense:  100 Strip     Refill:  0    lancets 32 gauge misc     Si Each by Does Not Apply route two (2) times daily (with meals). Dispense:  100 Lancet     Refill:  0       Results  Results for orders placed or performed in visit on 18   CBC WITH AUTOMATED DIFF   Result Value Ref Range    WBC 8.2 4.0 - 11.0 K/uL    RBC 4.86 3.80 - 5.20 M/uL    HGB 14.1 11.7 - 15.5 g/dL    HCT 42.1 35.1 - 46.5 %    MCV 87 80 - 95 fL    MCH 29 26 - 34 pg    MCHC 34 31 - 36 g/dL    RDW 14.6 10.0 - 15.5 %    PLATELET 309 686 - 786 K/uL    MPV 11.6 9.0 - 13.0 fL    NEUTROPHILS 58 40 - 75 %    Lymphocytes 35 20 - 45 %    MONOCYTES 6 3 - 12 %    EOSINOPHILS 1 0 - 6 %    BASOPHILS 0 0 - 2 %    ABS. NEUTROPHILS 4.8 1.8 - 7.7 K/uL    ABSOLUTE LYMPHOCYTE COUNT 2.8 1.0 - 4.8 K/uL    ABS. MONOCYTES 0.5 0.1 - 1.0 K/uL    ABS. EOSINOPHILS 0.1 0.0 - 0.5 K/uL    ABS. BASOPHILS 0.0 0.0 - 0.2 K/uL   METABOLIC PANEL, COMPREHENSIVE   Result Value Ref Range    Glucose 311 (H) 70 - 99 mg/dL    BUN 8 6 - 22 mg/dL    Creatinine 0.5 0.5 - 1.2 mg/dL    Sodium 138 133 - 145 mmol/L    Potassium 4.2 3.5 - 5.5 mmol/L    Chloride 95 (L) 98 - 110 mmol/L    CO2 25 20 - 32 mmol/L    AST (SGOT) 13 10 - 37 U/L    ALT (SGPT) 21 5 - 40 U/L    Alk.  phosphatase 85 25 - 115 U/L    Bilirubin, total 0.3 0.2 - 1.2 mg/dL    Calcium 9.8 8.4 - 10.5 mg/dL    Protein, total 8.4 (H) 6.4 - 8.3 g/dL    Albumin 4.8 3.5 - 5.0 g/dL    A-G Ratio 1.3 1.1 - 2.6 ratio    Globulin 3.6 2.0 - 4.0 g/dL    Anion gap 18.0 mmol/L    GFRAA >60.0 >60.0    GFRNA >60.0 >60.0   LIPID PANEL   Result Value Ref Range    Triglyceride 253 (H) 40 - 149 mg/dL    HDL Cholesterol 33 (L) 40 - 59 mg/dL    Cholesterol, total 264 (H) 110 - 200 mg/dL    CHOLESTEROL/HDL 8.0 0.0 - 5.0    LDL, calculated 180 (H) 50 - 99 mg/dL    VLDL, calculated 51 (H) 8 - 30 mg/dL   VITAMIN D, 25 HYDROXY   Result Value Ref Range    VITAMIN D, 25-HYDROXY 11.9 (L) 32.0 - 100.0 ng/mL   HEMOGLOBIN A1C W/O EAG   Result Value Ref Range    Hemoglobin A1c 12.5 (H) 4.8 - 5.9 %    AVG  (H) 91 - 123 mg/dL       Assessment and Plan  Diagnoses and all orders for this visit:    1. Controlled diabetes mellitus type 2 with complications, unspecified whether long term insulin use (HCC)  -     metFORMIN (GLUCOPHAGE) 500 mg tablet; Take 1 Tab by mouth two (2) times daily (with meals). -     insulin glargine (LANTUS,BASAGLAR) 100 unit/mL (3 mL) inpn; 10 units nightly  Indications: type 2 diabetes mellitus  -     Insulin Needles, Disposable, (BD ULTRA-FINE MICRO PEN NEEDLE) 32 gauge x 1/4\" ndle; 1 Box by Does Not Apply route two (2) times a day. -     dulaglutide (TRULICITY) 3.26 ZK/1.1 mL sub-q pen; 0.5 mL by SubCUTAneous route every seven (7) days. Indications: type 2 diabetes mellitus  -     Blood-Glucose Meter monitoring kit; Use once in the morning for fasting blood glucose and once two hours after a meal  -     glucose blood VI test strips (BLOOD GLUCOSE TEST) strip; Use once in the morning for fasting blood glucose and once two hours after a meal  -     REFERRAL TO DIABETIC EDUCATION  -     lancets 32 gauge misc; 100 Each by Does Not Apply route two (2) times daily (with meals). -     REFERRAL TO ENDOCRINOLOGY    2. Mixed hyperlipidemia    3. Essential hypertension    4. Vitamin D deficiency    5. Encounter to discuss test results    6. Encounter for diabetes education    All lab results discussed in detail.   More than 50% of 90 minute visit spent counseling and coordinating care with patient face to face on   Diabetes, medications (side effects, how use, return demonstration), labs, hypertension, diet, exercise, obesity/weight, lipids/cholesterol, how to perform blood glucose and log results, and vitamin D. After care summary printed and reviewed with patient. Plan reviewed with patient. Questions answered. Patient verbalized understanding of plan and is in agreement with plan. Patient to follow up in one week or earlier if symptoms worsen.  Will discuss and answer questions on diabetes at next visit, will start on statin at next visit, offer more diabetic education, complete a microalbuminuria, and foot exam.     Sarajane Cushing, MARTITA-C

## 2018-04-10 NOTE — PROGRESS NOTES
Chief Complaint   Patient presents with    Hypertension     Patient has been monitoring her BP at home around 9 AM and takes her BP medication at 0700 in the AM everyday.  Labs     Patient had last drawn at last visit. Patient labs to be discussed during this visit     1. Have you been to the ER, urgent care clinic since your last visit? Hospitalized since your last visit? No    2. Have you seen or consulted any other health care providers outside of the [address] [place] [address] since your last visit? Include any pap smears or colon screening.  No     Health Maintenance Due   Topic Date Due    PAP AKA CERVICAL CYTOLOGY  05/12/2011

## 2018-04-12 ENCOUNTER — TELEPHONE (OUTPATIENT)
Dept: FAMILY MEDICINE CLINIC | Age: 28
End: 2018-04-12

## 2018-04-12 NOTE — TELEPHONE ENCOUNTER
Patient called and left a message at the . In message patient is requesting Bydureon since it is covered by the insurance.

## 2018-04-13 DIAGNOSIS — E11.65 UNCONTROLLED TYPE 2 DIABETES MELLITUS WITH COMPLICATION, UNSPECIFIED WHETHER LONG TERM INSULIN USE: Primary | ICD-10-CM

## 2018-04-13 DIAGNOSIS — E11.8 UNCONTROLLED TYPE 2 DIABETES MELLITUS WITH COMPLICATION, UNSPECIFIED WHETHER LONG TERM INSULIN USE: Primary | ICD-10-CM

## 2018-04-13 NOTE — TELEPHONE ENCOUNTER
Patient has been informed that her PCP has prescribed Bydureon for her and the prescription was already sent into her pharmacy for .

## 2018-04-25 ENCOUNTER — OFFICE VISIT (OUTPATIENT)
Dept: FAMILY MEDICINE CLINIC | Age: 28
End: 2018-04-25

## 2018-04-25 VITALS
TEMPERATURE: 98.5 F | HEART RATE: 116 BPM | WEIGHT: 198 LBS | OXYGEN SATURATION: 99 % | DIASTOLIC BLOOD PRESSURE: 97 MMHG | BODY MASS INDEX: 37.38 KG/M2 | HEIGHT: 61 IN | RESPIRATION RATE: 16 BRPM | SYSTOLIC BLOOD PRESSURE: 154 MMHG

## 2018-04-25 DIAGNOSIS — I10 ESSENTIAL HYPERTENSION: Primary | ICD-10-CM

## 2018-04-25 DIAGNOSIS — E78.2 MIXED HYPERLIPIDEMIA: ICD-10-CM

## 2018-04-25 DIAGNOSIS — Z76.89 ENCOUNTER TO ESTABLISH CARE: ICD-10-CM

## 2018-04-25 DIAGNOSIS — E11.8 CONTROLLED DIABETES MELLITUS TYPE 2 WITH COMPLICATIONS, UNSPECIFIED WHETHER LONG TERM INSULIN USE (HCC): ICD-10-CM

## 2018-04-25 RX ORDER — METFORMIN HYDROCHLORIDE 500 MG/1
500 TABLET ORAL 2 TIMES DAILY WITH MEALS
Qty: 60 TAB | Refills: 0 | Status: CANCELLED | OUTPATIENT
Start: 2018-04-25

## 2018-04-25 RX ORDER — METFORMIN HYDROCHLORIDE EXTENDED-RELEASE TABLETS 1000 MG/1
TABLET, FILM COATED, EXTENDED RELEASE ORAL
Qty: 30 TAB | Refills: 0 | Status: SHIPPED | OUTPATIENT
Start: 2018-04-25 | End: 2018-05-01 | Stop reason: ALTCHOICE

## 2018-04-25 RX ORDER — AMLODIPINE BESYLATE 10 MG/1
10 TABLET ORAL DAILY
Qty: 30 TAB | Refills: 0 | Status: SHIPPED | OUTPATIENT
Start: 2018-04-25 | End: 2018-05-22 | Stop reason: SDUPTHER

## 2018-04-25 RX ORDER — HYDROCHLOROTHIAZIDE 25 MG/1
12.5 TABLET ORAL DAILY
Qty: 30 TAB | Refills: 0 | Status: SHIPPED | OUTPATIENT
Start: 2018-04-25 | End: 2018-06-04 | Stop reason: SDUPTHER

## 2018-04-25 RX ORDER — AMLODIPINE BESYLATE 5 MG/1
5 TABLET ORAL DAILY
Qty: 30 TAB | Refills: 0 | Status: SHIPPED | OUTPATIENT
Start: 2018-04-25 | End: 2018-04-25 | Stop reason: DRUGHIGH

## 2018-04-25 RX ORDER — PRAVASTATIN SODIUM 20 MG/1
20 TABLET ORAL
Qty: 30 TAB | Refills: 0 | Status: SHIPPED | OUTPATIENT
Start: 2018-04-25 | End: 2018-06-04 | Stop reason: SDUPTHER

## 2018-04-25 NOTE — PATIENT INSTRUCTIONS
Please contact our office if you have any questions about your visit today. Statins: Care Instructions  Your Care Instructions    Statins are medicines that lower your cholesterol and your risk for a heart attack and stroke. Cholesterol is a type of fat in your blood. If you have too much cholesterol, it can build up in blood vessels. This raises your risk of heart disease, heart attack, and stroke. Statins lower cholesterol by blocking how much your body makes. This prevents cholesterol from building up in your blood vessels. This is called hardening of the arteries. It is the starting point for some heart and blood flow problems, such as heart disease. Statins may also reduce inflammation around the buildup (called plaque). This can lower the risk that the plaque will break apart and lead to a heart attack or stroke. A heart-healthy lifestyle is important for lowering your risk whether you take statins or not. This includes eating healthy foods, being active, staying at a healthy weight, and not smoking. You must take statins regularly for them to work well. If you stop, your cholesterol and your risk will go back up. Examples of statins include:  · Atorvastatin (Lipitor). · Lovastatin (Mevacor). · Pravastatin (Pravachol). · Simvastatin (Zocor). Statins interact with many medicines. So tell your doctor all of the other medicines that you take. These include prescription medicines, over-the-counter medicines, dietary supplements, and herbal products. Follow-up care is a key part of your treatment and safety. Be sure to make and go to all appointments, and call your doctor if you are having problems. It's also a good idea to know your test results and keep a list of the medicines you take. How can you care for yourself at home? · Take statins exactly as your doctor tells you. High cholesterol has no symptoms. So it is easy to forget to take the pills.  Try to make a system that reminds you to take them.  · Do not take two or more medicines at the same time unless the doctor told you to. Statins can interact with other medicines. · Always tell your doctor if you think you are having a side effect. If side effects are a problem with one medicine, a different one may be used. · Keep making the lifestyle changes your doctor suggests. Eat heart-healthy foods, be active, don't smoke, and stay at a healthy weight. · Talk to your doctor about avoiding grapefruit juice if you take statins. Grapefruit juice can raise the level of this medicine in your blood. This could increase side effects. When should you call for help? Watch closely for changes in your health, and be sure to contact your doctor if:  ? · You think you are having problems with your medicine. ? · You have aches or muscle pain. Where can you learn more? Go to http://michelle-oleg.info/. Enter R358 in the search box to learn more about \"Statins: Care Instructions. \"  Current as of: September 21, 2016  Content Version: 11.4  © 6279-5614 [employer]. Care instructions adapted under license by [employer] (which disclaims liability or warranty for this information). If you have questions about a medical condition or this instruction, always ask your healthcare professional. [address] any warranty or liability for your use of this information. Pravastatin (By mouth)   Pravastatin (prav-a-STAT-in)  Treats high cholesterol and triglyceride levels. May reduce the risk of heart attack, stroke, and related health conditions. This medicine is a statin. Brand Name(s): Pravachol   There may be other brand names for this medicine. When This Medicine Should Not Be Used: This medicine is not right for everyone. Do not use it if you have had an allergic reaction to pravastatin, or you are pregnant or breastfeeding. How to Use This Medicine:   Tablet  · Take your medicine as directed. Your dose may need to be changed several times to find what works best for you. · Take this medicine in the evening or at bedtime, unless your doctor tells you differently. · Missed dose: Take a dose as soon as you remember. If it is almost time for your next dose, wait until then and take a regular dose. Do not take extra medicine to make up for a missed dose. · Store the medicine in a closed container at room temperature, away from heat, moisture, and direct light. Drugs and Foods to Avoid:   Ask your doctor or pharmacist before using any other medicine, including over-the-counter medicines, vitamins, and herbal products. · Some foods and medicines can affect how pravastatin works. Tell your doctor if you are using azithromycin, boceprevir, cimetidine, clarithromycin, colchicine, cyclosporine, erythromycin, itraconazole, ketoconazole, niacin (vitamin B3), or spironolactone. · Tell your doctor if you are taking other medicine to lower your cholesterol level, including cholestyramine, colestipol, gemfibrozil, or fenofibrate. You may need to take the medicine 1 hour before or 4 hours after you take pravastatin. Warnings While Using This Medicine:   · It is not safe to take this medicine during pregnancy. It could harm an unborn baby. Tell your doctor right away if you become pregnant. · Tell your doctor if you have kidney disease, liver disease, diabetes, epilepsy, muscle pain or weakness, or thyroid problems. Tell your doctor if you usually have more than 2 drinks of alcohol per day. · Tell any doctor or dentist who treats you that you are using this medicine. You may need to stop using this medicine several days before you have surgery or medical tests. · Your doctor will do lab tests at regular visits to check on the effects of this medicine. Keep all appointments. · Keep all medicine out of the reach of children. Never share your medicine with anyone.   Possible Side Effects While Using This Medicine: Call your doctor right away if you notice any of these side effects:  · Allergic reaction: Itching or hives, swelling in your face or hands, swelling or tingling in your mouth or throat, chest tightness, trouble breathing  · Blistering, peeling, red skin rash  · Dark urine or pale stools, diarrhea, nausea, vomiting, loss of appetite, pain in your upper stomach, yellow skin or eyes  · Muscle pain, tenderness, or weakness  · Unusual tiredness  If you notice these less serious side effects, talk with your doctor:   · Headache  If you notice other side effects that you think are caused by this medicine, tell your doctor. Call your doctor for medical advice about side effects. You may report side effects to FDA at 6-391-FDA-0408  © 2017 [address]  Information is for End User's use only and may not be sold, redistributed or otherwise used for commercial purposes. The above information is an  only. It is not intended as medical advice for individual conditions or treatments. Talk to your doctor, nurse or pharmacist before following any medical regimen to see if it is safe and effective for you. Pravastatin (Pravachol) - (By mouth)   Why this medicine is used:   Treats high cholesterol and triglyceride levels. May reduce the risk of heart attack, stroke, and related health conditions. Contact a nurse or doctor right away if you have:  · Dark urine or pale stools, diarrhea, nausea, vomiting, loss of appetite  · Yellow skin or eyes  · Pain in your upper stomach  · Muscle pain, tenderness, weakness  · Unusual tiredness     Common side effects:  · Headache  © 2017 [address] [employer]  Information is for End User's use only and may not be sold, redistributed or otherwise used for commercial purposes.

## 2018-04-25 NOTE — MR AVS SNAPSHOT
[address] [facility] 
 
 
 [place] [address] [address] [address] [address] [phone] Patient: Sari Hinds MRN: [id number] Visit Information Date & Time Provider Department Dept. Phone Encounter #  
 4/25/2018  8:00 AM Piero Hernadez NP [address] [phone] [id number] Follow-up Instructions Return in about 1 week (around 5/2/2018), or if symptoms worsen or fail to improve, for 30 min follow up. Upcoming Health Maintenance Date Due  
 FOOT EXAM Q1 5/12/2000 MICROALBUMIN Q1 5/12/2000 Pneumococcal 19-64 Medium Risk (1 of 1 - PPSV23) 5/12/2009 PAP AKA CERVICAL CYTOLOGY 5/12/2011 EYE EXAM RETINAL OR DILATED Q1 8/1/2018* HEMOGLOBIN A1C Q6M 10/2/2018 LIPID PANEL Q1 4/2/2019 DTaP/Tdap/Td series (2 - Td) 4/2/2028 *Topic was postponed. The date shown is not the original due date. Allergies as of 4/25/2018  Review Complete On: 4/25/2018 By: Piero Hernadez NP No Known Allergies Current Immunizations  Never Reviewed No immunizations on file. Not reviewed this visit You Were Diagnosed With   
  
 Codes Comments Essential hypertension    -  Primary ICD-10-CM: I10 
ICD-9-CM: 401.9 Encounter to establish care     ICD-10-CM: Z76.89 
ICD-9-CM: V65.8 Controlled diabetes mellitus type 2 with complications, unspecified whether long term insulin use (HCC)     ICD-10-CM: E11.8 ICD-9-CM: 250.90 Mixed hyperlipidemia     ICD-10-CM: E78.2 ICD-9-CM: 272.2 Vitals BP Pulse Temp Resp Height(growth percentile) Weight(growth percentile) (!) 154/97 (BP 1 Location: Left arm, BP Patient Position: Sitting) (!) 116 98.5 °F (36.9 °C) (Oral) 16 5' 1\" (1.549 m) 198 lb (89.8 kg) LMP SpO2 BMI OB Status Smoking Status 04/15/2018 (Exact Date) 99% 37.41 kg/m2 Having regular periods Former Smoker Vitals History BMI and BSA Data Body Mass Index Body Surface Area 37.41 kg/m 2 1.97 m 2 Preferred Pharmacy Pharmacy Name Phone [address], [address] [facility] Your Updated Medication List  
  
   
This list is accurate as of 4/25/18  9:22 AM.  Always use your most recent med list. amLODIPine 10 mg tablet Commonly known as:  Levagustin Kim Take 1 Tab by mouth daily. Blood-Glucose Meter monitoring kit Use once in the morning for fasting blood glucose and once two hours after a meal  
  
 ergocalciferol 50,000 unit capsule Commonly known as:  ERGOCALCIFEROL Take 1 Cap by mouth every seven (7) days. exenatide microspheres 2 mg/0.65 mL Pnij Commonly known as:  BYDUREON  
2 mg by SubCUTAneous route every seven (7) days. Indications: type 2 diabetes mellitus  
  
 glucose blood VI test strips strip Commonly known as:  blood glucose test  
Use once in the morning for fasting blood glucose and once two hours after a meal  
  
 hydroCHLOROthiazide 25 mg tablet Commonly known as:  HYDRODIURIL Take 0.5 Tabs by mouth daily. insulin glargine 100 unit/mL (3 mL) Inpn Commonly known as:  LANTUS,BASAGLAR  
10 units nightly  Indications: type 2 diabetes mellitus Insulin Needles (Disposable) 32 gauge x 1/4\" Ndle Commonly known as:  BD ULTRA-FINE MICRO PEN NEEDLE  
1 Box by Does Not Apply route two (2) times a day. lancets 32 gauge Misc  
100 Each by Does Not Apply route two (2) times daily (with meals). metFORMIN ER 1,000 mg Tr24 Take one tablet daily  
  
 potassium chloride 10 mEq tablet Commonly known as:  KLOR-CON Take 1 Tab by mouth daily. pravastatin 20 mg tablet Commonly known as:  PRAVACHOL Take 1 Tab by mouth nightly. Prescriptions Sent to Pharmacy Refills  
 hydroCHLOROthiazide (HYDRODIURIL) 25 mg tablet 0 Sig: Take 0.5 Tabs by mouth daily.   
 Class: Normal  
 Pharmacy: [facility] [address]ENO [address] Ph #: [phone] Route: Oral  
 metFORMIN ER 1,000 mg tr24 0 Sig: Take one tablet daily Class: Normal  
 Pharmacy: [facility] [address]ENO [address] Ph #: [phone]  
 amLODIPine (NORVASC) 10 mg tablet 0 Sig: Take 1 Tab by mouth daily. Class: Normal  
 Pharmacy: [facility] [address] MORENO [address] Ph #: [phone] Route: Oral  
 pravastatin (PRAVACHOL) 20 mg tablet 0 Sig: Take 1 Tab by mouth nightly. Class: Normal  
 Pharmacy: [facility] [address]ARRY MORENO [address] Ph #: [phone] Route: Oral  
  
We Performed the Following REFERRAL TO PODIATRY [REF90 Custom] Comments:  
 Please evaluate and treat patient for diabetic foot exam.  
  
Follow-up Instructions Return in about 1 week (around 5/2/2018), or if symptoms worsen or fail to improve, for 30 min follow up. To-Do List   
 04/25/2018 Lab:  MICROALBUMIN, UR, RAND W/ MICROALB/CREAT RATIO Referral Information Referral ID Referred By Referred To  
  
 [id number] Yael Farris, DPM   
   [address] Phone: [phone] Fax: [phone] Visits Status Start Date End Date 1 New Request 4/25/18 4/25/19 If your referral has a status of pending review or denied, additional information will be sent to support the outcome of this decision. Patient Instructions Please contact our office if you have any questions about your visit today. Statins: Care Instructions Your Care Instructions Statins are medicines that lower your cholesterol and your risk for a heart attack and stroke. Cholesterol is a type of fat in your blood. If you have too much cholesterol, it can build up in blood vessels. This raises your risk of heart disease, heart attack, and stroke. Statins lower cholesterol by blocking how much your body makes. This prevents cholesterol from building up in your blood vessels. This is called hardening of the arteries. It is the starting point for some heart and blood flow problems, such as heart disease. Statins may also reduce inflammation around the buildup (called plaque). This can lower the risk that the plaque will break apart and lead to a heart attack or stroke. A heart-healthy lifestyle is important for lowering your risk whether you take statins or not. This includes eating healthy foods, being active, staying at a healthy weight, and not smoking. You must take statins regularly for them to work well. If you stop, your cholesterol and your risk will go back up. Examples of statins include: · Atorvastatin (Lipitor). · Lovastatin (Mevacor). · Pravastatin (Pravachol). · Simvastatin (Zocor). Statins interact with many medicines. So tell your doctor all of the other medicines that you take. These include prescription medicines, over-the-counter medicines, dietary supplements, and herbal products. Follow-up care is a key part of your treatment and safety. Be sure to make and go to all appointments, and call your doctor if you are having problems. It's also a good idea to know your test results and keep a list of the medicines you take. How can you care for yourself at home? · Take statins exactly as your doctor tells you. High cholesterol has no symptoms. So it is easy to forget to take the pills. Try to make a system that reminds you to take them. · Do not take two or more medicines at the same time unless the doctor told you to. Statins can interact with other medicines. · Always tell your doctor if you think you are having a side effect. If side effects are a problem with one medicine, a different one may be used. · Keep making the lifestyle changes your doctor suggests.  Eat heart-healthy foods, be active, don't smoke, and stay at a healthy weight. · Talk to your doctor about avoiding grapefruit juice if you take statins. Grapefruit juice can raise the level of this medicine in your blood. This could increase side effects. When should you call for help? Watch closely for changes in your health, and be sure to contact your doctor if: 
? · You think you are having problems with your medicine. ? · You have aches or muscle pain. Where can you learn more? Go to http://michelle-oleg.info/. Enter R358 in the search box to learn more about \"Statins: Care Instructions. \" Current as of: September 21, 2016 Content Version: 11.4 © 8184-8759 [employer]. Care instructions adapted under license by [employer] (which disclaims liability or warranty for this information). If you have questions about a medical condition or this instruction, always ask your healthcare professional. [place] [address] any warranty or liability for your use of this information. Pravastatin (By mouth) Pravastatin (prav-a-STAT-in) Treats high cholesterol and triglyceride levels. May reduce the risk of heart attack, stroke, and related health conditions. This medicine is a statin. Brand Name(s): Pravachol There may be other brand names for this medicine. When This Medicine Should Not Be Used: This medicine is not right for everyone. Do not use it if you have had an allergic reaction to pravastatin, or you are pregnant or breastfeeding. How to Use This Medicine:  
Tablet · Take your medicine as directed. Your dose may need to be changed several times to find what works best for you. · Take this medicine in the evening or at bedtime, unless your doctor tells you differently. · Missed dose: Take a dose as soon as you remember. If it is almost time for your next dose, wait until then and take a regular dose.  Do not take extra medicine to make up for a missed dose. · Store the medicine in a closed container at room temperature, away from heat, moisture, and direct light. Drugs and Foods to Avoid: Ask your doctor or pharmacist before using any other medicine, including over-the-counter medicines, vitamins, and herbal products. · Some foods and medicines can affect how pravastatin works. Tell your doctor if you are using azithromycin, boceprevir, cimetidine, clarithromycin, colchicine, cyclosporine, erythromycin, itraconazole, ketoconazole, niacin (vitamin B3), or spironolactone. · Tell your doctor if you are taking other medicine to lower your cholesterol level, including cholestyramine, colestipol, gemfibrozil, or fenofibrate. You may need to take the medicine 1 hour before or 4 hours after you take pravastatin. Warnings While Using This Medicine: · It is not safe to take this medicine during pregnancy. It could harm an unborn baby. Tell your doctor right away if you become pregnant. · Tell your doctor if you have kidney disease, liver disease, diabetes, epilepsy, muscle pain or weakness, or thyroid problems. Tell your doctor if you usually have more than 2 drinks of alcohol per day. · Tell any doctor or dentist who treats you that you are using this medicine. You may need to stop using this medicine several days before you have surgery or medical tests. · Your doctor will do lab tests at regular visits to check on the effects of this medicine. Keep all appointments. · Keep all medicine out of the reach of children. Never share your medicine with anyone. Possible Side Effects While Using This Medicine:  
Call your doctor right away if you notice any of these side effects: · Allergic reaction: Itching or hives, swelling in your face or hands, swelling or tingling in your mouth or throat, chest tightness, trouble breathing · Blistering, peeling, red skin rash · Dark urine or pale stools, diarrhea, nausea, vomiting, loss of appetite, pain in your upper stomach, yellow skin or eyes · Muscle pain, tenderness, or weakness · Unusual tiredness If you notice these less serious side effects, talk with your doctor:  
· Headache If you notice other side effects that you think are caused by this medicine, tell your doctor. Call your doctor for medical advice about side effects. You may report side effects to FDA at 9-246-HGF-7784 © 2017 "[employer] Information is for End User's use only and may not be sold, redistributed or otherwise used for commercial purposes. The above information is an  only. It is not intended as medical advice for individual conditions or treatments. Talk to your doctor, nurse or pharmacist before following any medical regimen to see if it is safe and effective for you. Pravastatin (Pravachol) - (By mouth) Why this medicine is used:  
Treats high cholesterol and triglyceride levels. May reduce the risk of heart attack, stroke, and related health conditions. Contact a nurse or doctor right away if you have: · Dark urine or pale stools, diarrhea, nausea, vomiting, loss of appetite · Yellow skin or eyes · Pain in your upper stomach · Muscle pain, tenderness, weakness · Unusual tiredness Common side effects: 
· Headache © 2017 "[employer] Information is for End User's use only and may not be sold, redistributed or otherwise used for commercial purposes. Introducing [facility]! [employer] introduces [employer] patient portal. Now you can access parts of your medical record, email your doctor's office, and request medication refills online. 1. In your internet browser, go to [email or web address] 2. Click on the First Time User? Click Here link in the Sign In box. You will see the New Member Sign Up page. 3. Enter your [employer] Access Code exactly as it appears below. You will not need to use this code after youve completed the sign-up process. If you do not sign up before the expiration date, you must request a new code. · [employer] Access Code: [email or web address] Expires: 7/1/2018  9:27 AM 
 
4. Enter the last four digits of your Social Security Number (xxxx) and Date of Birth (mm/dd/yyyy) as indicated and click Submit. You will be taken to the next sign-up page. 5. Create a [employer] ID. This will be your [employer] login ID and cannot be changed, so think of one that is secure and easy to remember. 6. Create a [employer] password. You can change your password at any time. 7. Enter your Password Reset Question and Answer. This can be used at a later time if you forget your password. 8. Enter your e-mail address. You will receive e-mail notification when new information is available in [address] 9. Click Sign Up. You can now view and download portions of your medical record. 10. Click the Download Summary menu link to download a portable copy of your medical information. If you have questions, please visit the Frequently Asked Questions section of the [employer] website. Remember, [employer] is NOT to be used for urgent needs. For medical emergencies, dial 911. Now available from your iPhone and Android! Please provide this summary of care documentation to your next provider. Your primary care clinician is listed as María Reyes. If you have any questions after today's visit, please call [phone].

## 2018-04-25 NOTE — PROGRESS NOTES
Chief Complaint   Patient presents with    Follow-up     Patient following up with PCP following new medication. Patient has kept a log of blood sugars     1. Have you been to the ER, urgent care clinic since your last visit? Hospitalized since your last visit? No    2. Have you seen or consulted any other health care providers outside of the [address] [place] [address] since your last visit? Include any pap smears or colon screening.  No     Health Maintenance Due   Topic Date Due    FOOT EXAM Q1  05/12/2000    MICROALBUMIN Q1  05/12/2000    EYE EXAM RETINAL OR DILATED Q1  05/12/2000    Pneumococcal 19-64 Medium Risk (1 of 1 - PPSV23) 05/12/2009    PAP AKA CERVICAL CYTOLOGY  05/12/2011

## 2018-04-25 NOTE — PROGRESS NOTES
JULEE Alaniz is a 32 y.o. female  Chief Complaint   Patient presents with    Follow-up     Patient following up with PCP following new medication. Patient has kept a log of blood sugars     Admits to keeping her blood glucose log. Reports she has adjusted her diet but her blood sugars are still elevated some days. Blood glucose fasting in the mornings are 241, 248, 213, 155, 169, 189, 180, 220 (today). Reports after lunch her blood glucose has been 254, 271, 293, 199, 216, 247, and 213 per her log. Denies hypo or hyperglycemic episodes. Does admits to some jitteriness when she has gone the whole day without eating. Reports she does have Lantus and she takes this at night. Requesting form be completed so she can get her Bydureon. Reports metformin does give her some diarrhea but states it has improved daily. Reports taking her blood pressure medications. Denies chest pain or shortness of breath. Past Medical History  No past medical history on file. Surgical History  No past surgical history on file. Medications  Current Outpatient Prescriptions   Medication Sig Dispense Refill    hydroCHLOROthiazide (HYDRODIURIL) 25 mg tablet Take 0.5 Tabs by mouth daily. 30 Tab 0    metFORMIN ER 1,000 mg tr24 Take one tablet daily 30 Tab 0    amLODIPine (NORVASC) 10 mg tablet Take 1 Tab by mouth daily. 30 Tab 0    pravastatin (PRAVACHOL) 20 mg tablet Take 1 Tab by mouth nightly. 30 Tab 0    insulin glargine (LANTUS,BASAGLAR) 100 unit/mL (3 mL) inpn 10 units nightly  Indications: type 2 diabetes mellitus 3 Pen 0    Insulin Needles, Disposable, (BD ULTRA-FINE MICRO PEN NEEDLE) 32 gauge x 1/4\" ndle 1 Box by Does Not Apply route two (2) times a day.  100 Pen Needle 0    Blood-Glucose Meter monitoring kit Use once in the morning for fasting blood glucose and once two hours after a meal 1 Kit 0    glucose blood VI test strips (BLOOD GLUCOSE TEST) strip Use once in the morning for fasting blood glucose and once two hours after a meal 100 Strip 0    lancets 32 gauge misc 100 Each by Does Not Apply route two (2) times daily (with meals). 100 Lancet 0    ergocalciferol (ERGOCALCIFEROL) 50,000 unit capsule Take 1 Cap by mouth every seven (7) days. 12 Cap 3    potassium chloride (KLOR-CON) 10 mEq tablet Take 1 Tab by mouth daily. 30 Tab 0    exenatide microspheres (BYDUREON) 2 mg/0.65 mL pnij 2 mg by SubCUTAneous route every seven (7) days. Indications: type 2 diabetes mellitus 0.65 mL 3       Allergies  No Known Allergies    Family History  Family History   Problem Relation Age of Onset    Hypertension Mother     Hypertension Father     Hypertension Paternal Grandmother     Hypertension Paternal Grandfather        Social History  Social History     Social History    Marital status: SINGLE     Spouse name: N/A    Number of children: N/A    Years of education: N/A     Occupational History    Not on file. Social History Main Topics    Smoking status: Former Smoker     Start date: 4/2/2008     Quit date: 6/2/2013    Smokeless tobacco: Never Used    Alcohol use 1.2 - 1.8 oz/week     1 - 2 Glasses of wine, 0 Cans of beer, 1 Shots of liquor, 0 Standard drinks or equivalent per week    Drug use: No    Sexual activity: Yes     Partners: Male     Birth control/ protection: Condom     Other Topics Concern    Not on file     Social History Narrative       Problem List  Patient Active Problem List   Diagnosis Code    Controlled diabetes mellitus type 2 with complications ([facility].) D50.7    Mixed hyperlipidemia E78.2    Essential hypertension I10    Vitamin D deficiency E55.9    Severe obesity (BMI 35.0-39. 9) [facility] ([facility] E66.01       Review of Systems  Review of Systems   Constitutional: Negative for diaphoresis. Respiratory: Negative for shortness of breath. Cardiovascular: Negative for chest pain. Gastrointestinal: Positive for diarrhea. Negative for nausea and vomiting.    Genitourinary: Negative for dysuria, frequency and urgency. Endo/Heme/Allergies: Negative for polydipsia. Vital Signs  Vitals:    04/25/18 0820 04/25/18 0821   BP: (!) 150/103 (!) 154/97   Pulse: (!) 116    Resp: 16    Temp: 98.5 °F (36.9 °C)    TempSrc: Oral    SpO2: 99%    Weight: 198 lb (89.8 kg)    Height: 5' 1\" (1.549 m)    PainSc:   0 - No pain    LMP: 04/15/2018       Physical Exam  Physical Exam   Constitutional: She is oriented to person, place, and time. HENT:   Mouth/Throat: Oropharynx is clear and moist.   Cardiovascular: Normal rate, regular rhythm and normal heart sounds. Pulmonary/Chest: Effort normal and breath sounds normal.   Abdominal: Soft. Bowel sounds are normal. She exhibits no distension. Neurological: She is alert and oriented to person, place, and time. Psychiatric: She has a normal mood and affect. Her behavior is normal.       Diagnostics  Orders Placed This Encounter    MICROALBUMIN, UR, RAND W/ MICROALB/CREAT RATIO     Standing Status:   Future     Number of Occurrences:   1     Standing Expiration Date:   4/26/2019    REFERRAL TO PODIATRY     Referral Priority:   Routine     Referral Type:   Consultation     Referral Reason:   Specialty Services Required     Referred to Provider:   Harriett Rodriguez DPM     Requested Specialty:   Podiatry    DISCONTD: amLODIPine (NORVASC) 5 mg tablet     Sig: Take 1 Tab by mouth daily. Dispense:  30 Tab     Refill:  0    hydroCHLOROthiazide (HYDRODIURIL) 25 mg tablet     Sig: Take 0.5 Tabs by mouth daily. Dispense:  30 Tab     Refill:  0    metFORMIN ER 1,000 mg tr24     Sig: Take one tablet daily     Dispense:  30 Tab     Refill:  0    amLODIPine (NORVASC) 10 mg tablet     Sig: Take 1 Tab by mouth daily. Dispense:  30 Tab     Refill:  0    pravastatin (PRAVACHOL) 20 mg tablet     Sig: Take 1 Tab by mouth nightly.      Dispense:  30 Tab     Refill:  0       Results  Results for orders placed or performed in visit on 04/02/18   CBC WITH AUTOMATED DIFF   Result Value Ref Range    WBC 8.2 4.0 - 11.0 K/uL    RBC 4.86 3.80 - 5.20 M/uL    HGB 14.1 11.7 - 15.5 g/dL    HCT 42.1 35.1 - 46.5 %    MCV 87 80 - 95 fL    MCH 29 26 - 34 pg    MCHC 34 31 - 36 g/dL    RDW 14.6 10.0 - 15.5 %    PLATELET 251 256 - 685 K/uL    MPV 11.6 9.0 - 13.0 fL    NEUTROPHILS 58 40 - 75 %    Lymphocytes 35 20 - 45 %    MONOCYTES 6 3 - 12 %    EOSINOPHILS 1 0 - 6 %    BASOPHILS 0 0 - 2 %    ABS. NEUTROPHILS 4.8 1.8 - 7.7 K/uL    ABSOLUTE LYMPHOCYTE COUNT 2.8 1.0 - 4.8 K/uL    ABS. MONOCYTES 0.5 0.1 - 1.0 K/uL    ABS. EOSINOPHILS 0.1 0.0 - 0.5 K/uL    ABS. BASOPHILS 0.0 0.0 - 0.2 K/uL   METABOLIC PANEL, COMPREHENSIVE   Result Value Ref Range    Glucose 311 (H) 70 - 99 mg/dL    BUN 8 6 - 22 mg/dL    Creatinine 0.5 0.5 - 1.2 mg/dL    Sodium 138 133 - 145 mmol/L    Potassium 4.2 3.5 - 5.5 mmol/L    Chloride 95 (L) 98 - 110 mmol/L    CO2 25 20 - 32 mmol/L    AST (SGOT) 13 10 - 37 U/L    ALT (SGPT) 21 5 - 40 U/L    Alk. phosphatase 85 25 - 115 U/L    Bilirubin, total 0.3 0.2 - 1.2 mg/dL    Calcium 9.8 8.4 - 10.5 mg/dL    Protein, total 8.4 (H) 6.4 - 8.3 g/dL    Albumin 4.8 3.5 - 5.0 g/dL    A-G Ratio 1.3 1.1 - 2.6 ratio    Globulin 3.6 2.0 - 4.0 g/dL    Anion gap 18.0 mmol/L    GFRAA >60.0 >60.0    GFRNA >60.0 >60.0   LIPID PANEL   Result Value Ref Range    Triglyceride 253 (H) 40 - 149 mg/dL    HDL Cholesterol 33 (L) 40 - 59 mg/dL    Cholesterol, total 264 (H) 110 - 200 mg/dL    CHOLESTEROL/HDL 8.0 0.0 - 5.0    LDL, calculated 180 (H) 50 - 99 mg/dL    VLDL, calculated 51 (H) 8 - 30 mg/dL   VITAMIN D, 25 HYDROXY   Result Value Ref Range    VITAMIN D, 25-HYDROXY 11.9 (L) 32.0 - 100.0 ng/mL   HEMOGLOBIN A1C W/O EAG   Result Value Ref Range    Hemoglobin A1c 12.5 (H) 4.8 - 5.9 %    AVG  (H) 91 - 123 mg/dL           Assessment and Plan  Diagnoses and all orders for this visit:    1. Essential hypertension    2.  Encounter to establish care  -     hydroCHLOROthiazide (HYDRODIURIL) 25 mg tablet; Take 0.5 Tabs by mouth daily. 3. Controlled diabetes mellitus type 2 with complications, unspecified whether long term insulin use (HCC)  -     metFORMIN ER 1,000 mg tr24; Take one tablet daily  -     MICROALBUMIN, UR, RAND W/ MICROALB/CREAT RATIO; Future  -     REFERRAL TO PODIATRY    4. Mixed hyperlipidemia  -     pravastatin (PRAVACHOL) 20 mg tablet; Take 1 Tab by mouth nightly. Other orders  -     amLODIPine (NORVASC) 10 mg tablet; Take 1 Tab by mouth daily. More than 50% of 60 minute visit spent counseling and coordinating care with patient face to face on diabetes, medication, blood pressure, diet and exercise. Medication, side effects, possible allergic reactions and warnings reviewed with patient. Patient verbalized understanding. After care summary printed and reviewed with patient. Plan reviewed with patient. Questions answered. Patient verbalized understanding of plan and is in agreement with plan. Patient to follow up in one week or earlier if symptoms worsen.      Luz Maria Lakhani, FNP-C

## 2018-05-01 RX ORDER — METFORMIN HYDROCHLORIDE 1000 MG/1
1000 TABLET ORAL 2 TIMES DAILY WITH MEALS
Qty: 30 TAB | Refills: 0 | Status: SHIPPED | OUTPATIENT
Start: 2018-05-01 | End: 2018-05-22 | Stop reason: SDUPTHER

## 2018-05-11 ENCOUNTER — TELEPHONE (OUTPATIENT)
Dept: FAMILY MEDICINE CLINIC | Age: 28
End: 2018-05-11

## 2018-05-11 NOTE — TELEPHONE ENCOUNTER
Office called and stated that they needed to know strength and how its prescribed as well as dosage along with address to office so that medication can be sent to our office  Bydureon can be sent to office.

## 2018-05-16 NOTE — TELEPHONE ENCOUNTER
Patient has been called and informed that her Bydureon has come into the office and is ready for . Patient states that she will attempt to pick it up today 5/16/2018 or in the morning 5/17/2018.

## 2018-05-18 ENCOUNTER — OFFICE VISIT (OUTPATIENT)
Dept: FAMILY MEDICINE CLINIC | Age: 28
End: 2018-05-18

## 2018-05-18 VITALS
SYSTOLIC BLOOD PRESSURE: 140 MMHG | OXYGEN SATURATION: 98 % | WEIGHT: 200 LBS | HEART RATE: 109 BPM | RESPIRATION RATE: 16 BRPM | HEIGHT: 61 IN | DIASTOLIC BLOOD PRESSURE: 78 MMHG | TEMPERATURE: 97.9 F | BODY MASS INDEX: 37.76 KG/M2

## 2018-05-18 DIAGNOSIS — I10 ESSENTIAL HYPERTENSION: ICD-10-CM

## 2018-05-18 DIAGNOSIS — E11.8 TYPE 2 DIABETES MELLITUS WITH COMPLICATION, UNSPECIFIED WHETHER LONG TERM INSULIN USE: Primary | ICD-10-CM

## 2018-05-18 LAB
CREATININE, URINE: 46 MG/DL
MICROALB/CREAT RATIO, 140286: 32.4 MCG/MG OF CREATININE (ref 0–30)
MICROALBUMIN,URINE RANDOM 140054: 14.9 UG/ML (ref 0.1–17)

## 2018-05-18 RX ORDER — POTASSIUM CHLORIDE 750 MG/1
10 TABLET, EXTENDED RELEASE ORAL DAILY
Qty: 30 TAB | Refills: 0 | Status: SHIPPED | OUTPATIENT
Start: 2018-05-18 | End: 2018-06-15 | Stop reason: SDUPTHER

## 2018-05-18 NOTE — PATIENT INSTRUCTIONS
Please contact our office if you have any questions about your visit today. Body Mass Index: Care Instructions  Your Care Instructions    Body mass index (BMI) can help you see if your weight is raising your risk for health problems. It uses a formula to compare how much you weigh with how tall you are. · A BMI lower than 18.5 is considered underweight. · A BMI between 18.5 and 24.9 is considered healthy. · A BMI between 25 and 29.9 is considered overweight. A BMI of 30 or higher is considered obese. If your BMI is in the normal range, it means that you have a lower risk for weight-related health problems. If your BMI is in the overweight or obese range, you may be at increased risk for weight-related health problems, such as high blood pressure, heart disease, stroke, arthritis or joint pain, and diabetes. If your BMI is in the underweight range, you may be at increased risk for health problems such as fatigue, lower protection (immunity) against illness, muscle loss, bone loss, hair loss, and hormone problems. BMI is just one measure of your risk for weight-related health problems. You may be at higher risk for health problems if you are not active, you eat an unhealthy diet, or you drink too much alcohol or use tobacco products. Follow-up care is a key part of your treatment and safety. Be sure to make and go to all appointments, and call your doctor if you are having problems. It's also a good idea to know your test results and keep a list of the medicines you take. How can you care for yourself at home? · Practice healthy eating habits. This includes eating plenty of fruits, vegetables, whole grains, lean protein, and low-fat dairy. · If your doctor recommends it, get more exercise. Walking is a good choice. Bit by bit, increase the amount you walk every day. Try for at least 30 minutes on most days of the week. · Do not smoke. Smoking can increase your risk for health problems.  If you need help quitting, talk to your doctor about stop-smoking programs and medicines. These can increase your chances of quitting for good. · Limit alcohol to 2 drinks a day for men and 1 drink a day for women. Too much alcohol can cause health problems. If you have a BMI higher than 25  · Your doctor may do other tests to check your risk for weight-related health problems. This may include measuring the distance around your waist. A waist measurement of more than 40 inches in men or 35 inches in women can increase the risk of weight-related health problems. · Talk with your doctor about steps you can take to stay healthy or improve your health. You may need to make lifestyle changes to lose weight and stay healthy, such as changing your diet and getting regular exercise. If you have a BMI lower than 18.5  · Your doctor may do other tests to check your risk for health problems. · Talk with your doctor about steps you can take to stay healthy or improve your health. You may need to make lifestyle changes to gain or maintain weight and stay healthy, such as getting more healthy foods in your diet and doing exercises to build muscle. Where can you learn more? Go to http://michelle-oleg.info/. Enter S176 in the search box to learn more about \"Body Mass Index: Care Instructions. \"  Current as of: October 13, 2016  Content Version: 11.4  © 0719-1402 [employer]. Care instructions adapted under license by [employer] (which disclaims liability or warranty for this information). If you have questions about a medical condition or this instruction, always ask your healthcare professional. [place] [address] any warranty or liability for your use of this information.

## 2018-05-18 NOTE — PROGRESS NOTES
JULEE Swain is a 29 y.o. female  Chief Complaint   Patient presents with    Follow-up     Diabetes - Reports she has seen endocrinology and her metformin was decreased due to her loose stools. Reports she has started her Bydureon. Reports she has seen podiatry. Fasting blood glucose is ranging from 146-220. Hypertension - Denies chest pain, chest palpitations, blurred vision, or dizziness. Denies shortness of breath. Reports she is taking her blood pressure medication and she feels better. Obesity -Reports minimal exercise. Admits she has adjusted her diet and she is avoiding sugary drinks. Past Medical History  No past medical history on file. Surgical History  No past surgical history on file. Medications  Current Outpatient Prescriptions   Medication Sig Dispense Refill    potassium chloride (KLOR-CON) 10 mEq tablet Take 1 Tab by mouth daily. 30 Tab 0    metFORMIN (GLUCOPHAGE) 1,000 mg tablet Take 1 Tab by mouth two (2) times daily (with meals). 30 Tab 0    hydroCHLOROthiazide (HYDRODIURIL) 25 mg tablet Take 0.5 Tabs by mouth daily. 30 Tab 0    amLODIPine (NORVASC) 10 mg tablet Take 1 Tab by mouth daily. 30 Tab 0    pravastatin (PRAVACHOL) 20 mg tablet Take 1 Tab by mouth nightly. 30 Tab 0    exenatide microspheres (BYDUREON) 2 mg/0.65 mL pnij 2 mg by SubCUTAneous route every seven (7) days. Indications: type 2 diabetes mellitus 0.65 mL 3    insulin glargine (LANTUS,BASAGLAR) 100 unit/mL (3 mL) inpn 10 units nightly  Indications: type 2 diabetes mellitus 3 Pen 0    Insulin Needles, Disposable, (BD ULTRA-FINE MICRO PEN NEEDLE) 32 gauge x 1/4\" ndle 1 Box by Does Not Apply route two (2) times a day.  100 Pen Needle 0    Blood-Glucose Meter monitoring kit Use once in the morning for fasting blood glucose and once two hours after a meal 1 Kit 0    glucose blood VI test strips (BLOOD GLUCOSE TEST) strip Use once in the morning for fasting blood glucose and once two hours after a meal 100 Strip 0    lancets 32 gauge misc 100 Each by Does Not Apply route two (2) times daily (with meals). 100 Lancet 0    ergocalciferol (ERGOCALCIFEROL) 50,000 unit capsule Take 1 Cap by mouth every seven (7) days. 12 Cap 3       Allergies  No Known Allergies    Family History  Family History   Problem Relation Age of Onset    Hypertension Mother     Hypertension Father     Hypertension Paternal Grandmother     Hypertension Paternal Grandfather        Social History  Social History     Social History    Marital status: SINGLE     Spouse name: N/A    Number of children: N/A    Years of education: N/A     Occupational History    Not on file. Social History Main Topics    Smoking status: Former Smoker     Start date: 4/2/2008     Quit date: 6/2/2013    Smokeless tobacco: Never Used    Alcohol use 1.2 - 1.8 oz/week     1 - 2 Glasses of wine, 0 Cans of beer, 1 Shots of liquor, 0 Standard drinks or equivalent per week    Drug use: No    Sexual activity: Yes     Partners: Male     Birth control/ protection: Condom     Other Topics Concern    Not on file     Social History Narrative       Problem List  Patient Active Problem List   Diagnosis Code    Controlled diabetes mellitus type 2 with complications ([facility] K61.3    Mixed hyperlipidemia E78.2    Essential hypertension I10    Vitamin D deficiency E55.9    Severe obesity (BMI 35.0-39. 9) [facility] comorbidity ([facility] E66.01       Review of Systems  Review of Systems   Constitutional: Negative for chills and fever. Respiratory: Negative for shortness of breath. Cardiovascular: Negative for chest pain and palpitations. Gastrointestinal: Positive for diarrhea. Negative for abdominal pain, nausea and vomiting. Genitourinary: Negative for frequency and urgency. Endo/Heme/Allergies: Positive for environmental allergies.        Vital Signs  Vitals:    05/18/18 0756   BP: 140/78   Pulse: (!) 109   Resp: 16   Temp: 97.9 °F (36.6 °C)   TempSrc: Oral   SpO2: 98%   Weight: 200 lb (90.7 kg)   Height: 5' 1\" (1.549 m)   PainSc:   0 - No pain   LMP: 05/16/2018       Physical Exam  Physical Exam   Constitutional: She is oriented to person, place, and time. HENT:   Nose: Nose normal.   Mouth/Throat: Oropharynx is clear and moist. No oropharyngeal exudate. Eyes: Pupils are equal, round, and reactive to light. Cardiovascular: Regular rhythm and normal heart sounds. Tachycardia present. No murmur heard. Pulmonary/Chest: Effort normal and breath sounds normal.   Abdominal: Soft. Bowel sounds are normal.   Lymphadenopathy:     She has no cervical adenopathy. Neurological: She is alert and oriented to person, place, and time. Psychiatric: She has a normal mood and affect. Her behavior is normal.       Diagnostics  Orders Placed This Encounter    MICROALBUMIN, UR, RAND    REFERRAL TO OPHTHALMOLOGY     Referral Priority:   Routine     Referral Type:   Consultation     Referral Reason:   Specialty Services Required    REFERRAL TO DIABETIC EDUCATION     Referral Priority:   Routine     Referral Type:   Consultation     Referral Reason:   Specialty Services Required     Number of Visits Requested:   1    potassium chloride (KLOR-CON) 10 mEq tablet     Sig: Take 1 Tab by mouth daily. Dispense:  30 Tab     Refill:  0       Results  Results for orders placed or performed in visit on 04/02/18   CBC WITH AUTOMATED DIFF   Result Value Ref Range    WBC 8.2 4.0 - 11.0 K/uL    RBC 4.86 3.80 - 5.20 M/uL    HGB 14.1 11.7 - 15.5 g/dL    HCT 42.1 35.1 - 46.5 %    MCV 87 80 - 95 fL    MCH 29 26 - 34 pg    MCHC 34 31 - 36 g/dL    RDW 14.6 10.0 - 15.5 %    PLATELET 644 346 - 009 K/uL    MPV 11.6 9.0 - 13.0 fL    NEUTROPHILS 58 40 - 75 %    Lymphocytes 35 20 - 45 %    MONOCYTES 6 3 - 12 %    EOSINOPHILS 1 0 - 6 %    BASOPHILS 0 0 - 2 %    ABS. NEUTROPHILS 4.8 1.8 - 7.7 K/uL    ABSOLUTE LYMPHOCYTE COUNT 2.8 1.0 - 4.8 K/uL    ABS. MONOCYTES 0.5 0.1 - 1.0 K/uL    ABS.  EOSINOPHILS 0.1 0.0 - 0.5 K/uL    ABS. BASOPHILS 0.0 0.0 - 0.2 K/uL   METABOLIC PANEL, COMPREHENSIVE   Result Value Ref Range    Glucose 311 (H) 70 - 99 mg/dL    BUN 8 6 - 22 mg/dL    Creatinine 0.5 0.5 - 1.2 mg/dL    Sodium 138 133 - 145 mmol/L    Potassium 4.2 3.5 - 5.5 mmol/L    Chloride 95 (L) 98 - 110 mmol/L    CO2 25 20 - 32 mmol/L    AST (SGOT) 13 10 - 37 U/L    ALT (SGPT) 21 5 - 40 U/L    Alk. phosphatase 85 25 - 115 U/L    Bilirubin, total 0.3 0.2 - 1.2 mg/dL    Calcium 9.8 8.4 - 10.5 mg/dL    Protein, total 8.4 (H) 6.4 - 8.3 g/dL    Albumin 4.8 3.5 - 5.0 g/dL    A-G Ratio 1.3 1.1 - 2.6 ratio    Globulin 3.6 2.0 - 4.0 g/dL    Anion gap 18.0 mmol/L    GFRAA >60.0 >60.0    GFRNA >60.0 >60.0   LIPID PANEL   Result Value Ref Range    Triglyceride 253 (H) 40 - 149 mg/dL    HDL Cholesterol 33 (L) 40 - 59 mg/dL    Cholesterol, total 264 (H) 110 - 200 mg/dL    CHOLESTEROL/HDL 8.0 0.0 - 5.0    LDL, calculated 180 (H) 50 - 99 mg/dL    VLDL, calculated 51 (H) 8 - 30 mg/dL   VITAMIN D, 25 HYDROXY   Result Value Ref Range    VITAMIN D, 25-HYDROXY 11.9 (L) 32.0 - 100.0 ng/mL   HEMOGLOBIN A1C W/O EAG   Result Value Ref Range    Hemoglobin A1c 12.5 (H) 4.8 - 5.9 %    AVG  (H) 91 - 123 mg/dL       Assessment and Plan  Diagnoses and all orders for this visit:    1. Type 2 diabetes mellitus with complication, unspecified whether long term insulin use (HCC)  -     REFERRAL TO OPHTHALMOLOGY  -     REFERRAL TO DIABETIC EDUCATION  -     MICROALBUMIN, UR, RAND    2. Essential hypertension  -     potassium chloride (KLOR-CON) 10 mEq tablet; Take 1 Tab by mouth daily. 3. BMI 37.0-37.9, adult        After care summary printed and reviewed with patient. Plan reviewed with patient. Questions answered. Patient verbalized understanding of plan and is in agreement with plan. Patient to follow up in one week or earlier if symptoms worsen. CHIRST Rao    Discussed the patient's BMI with her.   The BMI follow up plan is as follows: dietary management education, guidance, and counseling  encourage exercise  monitor weight  prescribed dietary intake    An After Visit Summary was printed and given to the patient.     CHRIST Juarez

## 2018-05-18 NOTE — MR AVS SNAPSHOT
[address] [facility] 
 
 
 [address] [facility] Line [address] [phone] Patient: Radha Alberts MRN: [id number] [id number] Visit Information Date & Time Provider Department Dept. Phone Encounter #  
 5/18/2018  7:45 AM Jayce Gonsalez NP [address] [phone] [id number] Follow-up Instructions Return in about 1 month (around 6/18/2018), or if symptoms worsen or fail to improve. Upcoming Health Maintenance Date Due  
 FOOT EXAM Q1 5/12/2000 MICROALBUMIN Q1 5/12/2000 Pneumococcal 19-64 Medium Risk (1 of 1 - PPSV23) 5/12/2009 PAP AKA CERVICAL CYTOLOGY 5/12/2011 EYE EXAM RETINAL OR DILATED Q1 8/1/2018* Influenza Age 5 to Adult 8/1/2018 HEMOGLOBIN A1C Q6M 10/2/2018 LIPID PANEL Q1 4/2/2019 DTaP/Tdap/Td series (2 - Td) 4/2/2028 *Topic was postponed. The date shown is not the original due date. Allergies as of 5/18/2018  Review Complete On: 5/18/2018 By: Jayce Gonsalez NP No Known Allergies Current Immunizations  Never Reviewed No immunizations on file. Not reviewed this visit You Were Diagnosed With   
  
 Codes Comments Type 2 diabetes mellitus with complication, unspecified whether long term insulin use (HCC)    -  Primary ICD-10-CM: E11.8 ICD-9-CM: 250.90 Essential hypertension     ICD-10-CM: I10 
ICD-9-CM: 401.9 Vitals BP Pulse Temp Resp Height(growth percentile) Weight(growth percentile) 140/78 (BP 1 Location: Right arm, BP Patient Position: Sitting) (!) 109 97.9 °F (36.6 °C) (Oral) 16 5' 1\" (1.549 m) 200 lb (90.7 kg) LMP SpO2 BMI OB Status Smoking Status 05/16/2018 (Exact Date) 98% 37.79 kg/m2 Having regular periods Former Smoker Vitals History BMI and BSA Data Body Mass Index Body Surface Area  
 37.79 kg/m 2 1.98 m 2 Preferred Pharmacy Pharmacy Name Phone [address], [address] [facility] Your Updated Medication List  
  
   
This list is accurate as of 5/18/18  8:25 AM.  Always use your most recent med list. amLODIPine 10 mg tablet Commonly known as:  Domingo Cordial Take 1 Tab by mouth daily. Blood-Glucose Meter monitoring kit Use once in the morning for fasting blood glucose and once two hours after a meal  
  
 ergocalciferol 50,000 unit capsule Commonly known as:  ERGOCALCIFEROL Take 1 Cap by mouth every seven (7) days. exenatide microspheres 2 mg/0.65 mL Pnij Commonly known as:  BYDUREON  
2 mg by SubCUTAneous route every seven (7) days. Indications: type 2 diabetes mellitus  
  
 glucose blood VI test strips strip Commonly known as:  blood glucose test  
Use once in the morning for fasting blood glucose and once two hours after a meal  
  
 hydroCHLOROthiazide 25 mg tablet Commonly known as:  HYDRODIURIL Take 0.5 Tabs by mouth daily. insulin glargine 100 unit/mL (3 mL) Inpn Commonly known as:  LANBECKYBASAGLAR  
10 units nightly  Indications: type 2 diabetes mellitus Insulin Needles (Disposable) 32 gauge x 1/4\" Ndle Commonly known as:  BD ULTRA-FINE MICRO PEN NEEDLE  
1 Box by Does Not Apply route two (2) times a day. lancets 32 gauge Misc  
100 Each by Does Not Apply route two (2) times daily (with meals). metFORMIN 1,000 mg tablet Commonly known as:  GLUCOPHAGE Take 1 Tab by mouth two (2) times daily (with meals). potassium chloride 10 mEq tablet Commonly known as:  KLOR-CON Take 1 Tab by mouth daily. pravastatin 20 mg tablet Commonly known as:  PRAVACHOL Take 1 Tab by mouth nightly. Prescriptions Sent to Pharmacy Refills  
 potassium chloride (KLOR-CON) 10 mEq tablet 0 Sig: Take 1 Tab by mouth daily. Class: Normal  
 Pharmacy: [facility] [address] Ph #: [phone] Route: Oral  
  
We Performed the Following MICROALBUMIN, UR, RAND W/ MICROALB/CREAT RATIO [id number] CPT(R)] REFERRAL TO DIABETIC EDUCATION [REF20 Custom] REFERRAL TO OPHTHALMOLOGY [REF57 Custom] Follow-up Instructions Return in about 1 month (around 6/18/2018), or if symptoms worsen or fail to improve. Referral Information Referral ID Referred By Referred To  
  
 [id number] Rekha CORNEJO Not Available Visits Status Start Date End Date 1 New Request 5/18/18 5/18/19 If your referral has a status of pending review or denied, additional information will be sent to support the outcome of this decision. Referral ID Referred By Referred To  
 [id number] Rekha Mcgrathri B Not Available Visits Status Start Date End Date 1 New Request 5/18/18 5/18/19 If your referral has a status of pending review or denied, additional information will be sent to support the outcome of this decision. Patient Instructions Please contact our office if you have any questions about your visit today. Body Mass Index: Care Instructions Your Care Instructions Body mass index (BMI) can help you see if your weight is raising your risk for health problems. It uses a formula to compare how much you weigh with how tall you are. · A BMI lower than 18.5 is considered underweight. · A BMI between 18.5 and 24.9 is considered healthy. · A BMI between 25 and 29.9 is considered overweight. A BMI of 30 or higher is considered obese. If your BMI is in the normal range, it means that you have a lower risk for weight-related health problems. If your BMI is in the overweight or obese range, you may be at increased risk for weight-related health problems, such as high blood pressure, heart disease, stroke, arthritis or joint pain, and diabetes.  If your BMI is in the underweight range, you may be at increased risk for health problems such as fatigue, lower protection (immunity) against illness, muscle loss, bone loss, hair loss, and hormone problems. BMI is just one measure of your risk for weight-related health problems. You may be at higher risk for health problems if you are not active, you eat an unhealthy diet, or you drink too much alcohol or use tobacco products. Follow-up care is a key part of your treatment and safety. Be sure to make and go to all appointments, and call your doctor if you are having problems. It's also a good idea to know your test results and keep a list of the medicines you take. How can you care for yourself at home? · Practice healthy eating habits. This includes eating plenty of fruits, vegetables, whole grains, lean protein, and low-fat dairy. · If your doctor recommends it, get more exercise. Walking is a good choice. Bit by bit, increase the amount you walk every day. Try for at least 30 minutes on most days of the week. · Do not smoke. Smoking can increase your risk for health problems. If you need help quitting, talk to your doctor about stop-smoking programs and medicines. These can increase your chances of quitting for good. · Limit alcohol to 2 drinks a day for men and 1 drink a day for women. Too much alcohol can cause health problems. If you have a BMI higher than 25 · Your doctor may do other tests to check your risk for weight-related health problems. This may include measuring the distance around your waist. A waist measurement of more than 40 inches in men or 35 inches in women can increase the risk of weight-related health problems. · Talk with your doctor about steps you can take to stay healthy or improve your health. You may need to make lifestyle changes to lose weight and stay healthy, such as changing your diet and getting regular exercise. If you have a BMI lower than 18.5 · Your doctor may do other tests to check your risk for health problems.  
· Talk with your doctor about steps you can take to stay healthy or improve your health. You may need to make lifestyle changes to gain or maintain weight and stay healthy, such as getting more healthy foods in your diet and doing exercises to build muscle. Where can you learn more? Go to http://michelle-oleg.info/. Enter S176 in the search box to learn more about \"Body Mass Index: Care Instructions. \" Current as of: October 13, 2016 Content Version: 11.4 © 9268-4464 [employer]. Care instructions adapted under license by [employer] (which disclaims liability or warranty for this information). If you have questions about a medical condition or this instruction, always ask your healthcare professional. [address] any warranty or liability for your use of this information. Introducing [facility]! [facility] introduces Truist patient portal. Now you can access parts of your medical record, email your doctor's office, and request medication refills online. 1. In your internet browser, go to [email or web address] 2. Click on the First Time User? Click Here link in the Sign In box. You will see the New Member Sign Up page. 3. Enter your Truist Access Code exactly as it appears below. You will not need to use this code after youve completed the sign-up process. If you do not sign up before the expiration date, you must request a new code. · Truist Access Code: [email or web address] Expires: 7/1/2018  9:27 AM 
 
4. Enter the last four digits of your Social Security Number (xxxx) and Date of Birth (mm/dd/yyyy) as indicated and click Submit. You will be taken to the next sign-up page. 5. Create a [employer] ID. This will be your Truist login ID and cannot be changed, so think of one that is secure and easy to remember. 6. Create a Truist password. You can change your password at any time. 7. Enter your Password Reset Question and Answer.  This can be used at a later time if you forget your password. 8. Enter your e-mail address. You will receive e-mail notification when new information is available in [address] 9. Click Sign Up. You can now view and download portions of your medical record. 10. Click the Download Summary menu link to download a portable copy of your medical information. If you have questions, please visit the Frequently Asked Questions section of the [employer] website. Remember, What[employer]on is NOT to be used for urgent needs. For medical emergencies, dial 911. Now available from your iPhone and Android! Please provide this summary of care documentation to your next provider. Your primary care clinician is listed as Bryant Duran. If you have any questions after today's visit, please call [phone].

## 2018-05-18 NOTE — PROGRESS NOTES
Chief Complaint   Patient presents with    Follow-up     1. Have you been to the ER, urgent care clinic since your last visit? Hospitalized since your last visit? No    2. Have you seen or consulted any other health care providers outside of the [address] [place] [address] since your last visit? Include any pap smears or colon screening.  No     Health Maintenance Due   Topic Date Due    FOOT EXAM Q1  05/12/2000    MICROALBUMIN Q1  05/12/2000    Pneumococcal 19-64 Medium Risk (1 of 1 - PPSV23) 05/12/2009    PAP AKA CERVICAL CYTOLOGY  05/12/2011

## 2018-05-22 NOTE — TELEPHONE ENCOUNTER
Pt called requesting refill for medication . Requested Prescriptions     Pending Prescriptions Disp Refills    metFORMIN (GLUCOPHAGE) 1,000 mg tablet 30 Tab 0     Sig: Take 1 Tab by mouth two (2) times daily (with meals).  amLODIPine (NORVASC) 10 mg tablet 30 Tab 0     Sig: Take 1 Tab by mouth daily.

## 2018-05-23 RX ORDER — METFORMIN HYDROCHLORIDE 1000 MG/1
1000 TABLET ORAL 2 TIMES DAILY WITH MEALS
Qty: 30 TAB | Refills: 0 | Status: SHIPPED | OUTPATIENT
Start: 2018-05-23 | End: 2018-06-26 | Stop reason: SDUPTHER

## 2018-05-23 RX ORDER — AMLODIPINE BESYLATE 10 MG/1
10 TABLET ORAL DAILY
Qty: 30 TAB | Refills: 0 | Status: SHIPPED | OUTPATIENT
Start: 2018-05-23 | End: 2018-06-15 | Stop reason: SDUPTHER

## 2018-06-04 DIAGNOSIS — E78.2 MIXED HYPERLIPIDEMIA: ICD-10-CM

## 2018-06-04 DIAGNOSIS — Z76.89 ENCOUNTER TO ESTABLISH CARE: ICD-10-CM

## 2018-06-04 RX ORDER — PRAVASTATIN SODIUM 20 MG/1
20 TABLET ORAL
Qty: 30 TAB | Refills: 0 | Status: SHIPPED | OUTPATIENT
Start: 2018-06-04 | End: 2018-06-08 | Stop reason: SDUPTHER

## 2018-06-04 RX ORDER — HYDROCHLOROTHIAZIDE 25 MG/1
12.5 TABLET ORAL DAILY
Qty: 30 TAB | Refills: 0 | Status: SHIPPED | OUTPATIENT
Start: 2018-06-04 | End: 2018-06-15 | Stop reason: SDUPTHER

## 2018-06-04 NOTE — TELEPHONE ENCOUNTER
Pt called requesting refill for medication . Requested Prescriptions     Pending Prescriptions Disp Refills    hydroCHLOROthiazide (HYDRODIURIL) 25 mg tablet 30 Tab 0     Sig: Take 0.5 Tabs by mouth daily.  pravastatin (PRAVACHOL) 20 mg tablet 30 Tab 0     Sig: Take 1 Tab by mouth nightly.

## 2018-06-04 NOTE — TELEPHONE ENCOUNTER
Patient refill request sent to patient PCP. Patient last seen in office 5/18/2018. Patient medications were each last filled 4/25/2018 with 30 tabs.

## 2018-06-11 ENCOUNTER — TELEPHONE (OUTPATIENT)
Dept: FAMILY MEDICINE CLINIC | Age: 28
End: 2018-06-11

## 2018-06-11 NOTE — TELEPHONE ENCOUNTER
Patient PCP has been sent message requesting that patients prescription for BP medication be rewritten to reflect changes to the dose they are to take. Patient states that at last office visit 5/18/2018 medication HCTZ was increased from 1/2 tabs to 1 whole tab. Patient left message stating that pharmacy was not going to fill prescription due to it being too early to fill.

## 2018-06-15 ENCOUNTER — OFFICE VISIT (OUTPATIENT)
Dept: FAMILY MEDICINE CLINIC | Age: 28
End: 2018-06-15

## 2018-06-15 VITALS
WEIGHT: 201 LBS | BODY MASS INDEX: 37.95 KG/M2 | RESPIRATION RATE: 16 BRPM | HEART RATE: 107 BPM | SYSTOLIC BLOOD PRESSURE: 136 MMHG | OXYGEN SATURATION: 100 % | HEIGHT: 61 IN | DIASTOLIC BLOOD PRESSURE: 80 MMHG | TEMPERATURE: 98.1 F

## 2018-06-15 DIAGNOSIS — R51.9 GENERALIZED HEADACHES: ICD-10-CM

## 2018-06-15 DIAGNOSIS — R31.9 URINARY TRACT INFECTION WITH HEMATURIA, SITE UNSPECIFIED: ICD-10-CM

## 2018-06-15 DIAGNOSIS — R39.15 URINARY URGENCY: ICD-10-CM

## 2018-06-15 DIAGNOSIS — E55.9 VITAMIN D DEFICIENCY: ICD-10-CM

## 2018-06-15 DIAGNOSIS — I10 ESSENTIAL HYPERTENSION: ICD-10-CM

## 2018-06-15 DIAGNOSIS — R35.0 URINARY FREQUENCY: ICD-10-CM

## 2018-06-15 DIAGNOSIS — E11.8 TYPE 2 DIABETES MELLITUS WITH COMPLICATION, UNSPECIFIED WHETHER LONG TERM INSULIN USE: Primary | ICD-10-CM

## 2018-06-15 DIAGNOSIS — E78.2 MIXED HYPERLIPIDEMIA: ICD-10-CM

## 2018-06-15 DIAGNOSIS — N39.0 URINARY TRACT INFECTION WITH HEMATURIA, SITE UNSPECIFIED: ICD-10-CM

## 2018-06-15 PROBLEM — E11.21 TYPE 2 DIABETES WITH NEPHROPATHY (HCC): Status: ACTIVE | Noted: 2018-06-15

## 2018-06-15 LAB
BILIRUB UR QL STRIP: NEGATIVE
GLUCOSE UR-MCNC: NEGATIVE MG/DL
KETONES P FAST UR STRIP-MCNC: NEGATIVE MG/DL
PH UR STRIP: 5.5 [PH] (ref 4.6–8)
PROT UR QL STRIP: NEGATIVE
SP GR UR STRIP: 1.01 (ref 1–1.03)
UA UROBILINOGEN AMB POC: NORMAL (ref 0.2–1)
URINALYSIS CLARITY POC: CLEAR
URINALYSIS COLOR POC: YELLOW
URINE BLOOD POC: NORMAL
URINE LEUKOCYTES POC: NORMAL
URINE NITRITES POC: NEGATIVE

## 2018-06-15 RX ORDER — ERGOCALCIFEROL 1.25 MG/1
50000 CAPSULE ORAL
Qty: 12 CAP | Refills: 3 | Status: SHIPPED | OUTPATIENT
Start: 2018-06-15 | End: 2019-02-04 | Stop reason: ALTCHOICE

## 2018-06-15 RX ORDER — FLUCONAZOLE 150 MG/1
150 TABLET ORAL DAILY
Qty: 1 TAB | Refills: 0 | Status: SHIPPED | OUTPATIENT
Start: 2018-06-15 | End: 2018-06-16

## 2018-06-15 RX ORDER — PRAVASTATIN SODIUM 20 MG/1
TABLET ORAL
Qty: 90 TAB | Refills: 1 | Status: SHIPPED | OUTPATIENT
Start: 2018-06-15 | End: 2020-06-24 | Stop reason: SDUPTHER

## 2018-06-15 RX ORDER — METFORMIN HYDROCHLORIDE 1000 MG/1
500 TABLET ORAL 2 TIMES DAILY WITH MEALS
Status: CANCELLED | OUTPATIENT
Start: 2018-06-15

## 2018-06-15 RX ORDER — POTASSIUM CHLORIDE 750 MG/1
10 TABLET, EXTENDED RELEASE ORAL DAILY
Qty: 30 TAB | Refills: 5 | Status: SHIPPED | OUTPATIENT
Start: 2018-06-15

## 2018-06-15 RX ORDER — AMLODIPINE BESYLATE 10 MG/1
10 TABLET ORAL DAILY
Qty: 30 TAB | Refills: 5 | Status: SHIPPED | OUTPATIENT
Start: 2018-06-15 | End: 2018-12-27 | Stop reason: SDUPTHER

## 2018-06-15 RX ORDER — HYDROCHLOROTHIAZIDE 25 MG/1
25 TABLET ORAL DAILY
Qty: 30 TAB | Refills: 5 | Status: SHIPPED | OUTPATIENT
Start: 2018-06-15 | End: 2018-07-10 | Stop reason: SDUPTHER

## 2018-06-15 RX ORDER — NITROFURANTOIN (MACROCRYSTALS) 100 MG/1
100 CAPSULE ORAL 2 TIMES DAILY
Qty: 6 CAP | Refills: 0 | Status: SHIPPED | OUTPATIENT
Start: 2018-06-15 | End: 2018-06-18

## 2018-06-15 NOTE — PROGRESS NOTES
Chief Complaint   Patient presents with    Follow-up    Medication Evaluation     Patient would like to get the metforming 500 mg tablet instead of having to cut the 1000 mg tab in half     1. Have you been to the ER, urgent care clinic since your last visit? Hospitalized since your last visit? No    2. Have you seen or consulted any other health care providers outside of the [facility] since your last visit? Include any pap smears or colon screening.  No     Health Maintenance Due   Topic Date Due    FOOT EXAM Q1  05/12/2000    Pneumococcal 19-64 Medium Risk (1 of 1 - PPSV23) 05/12/2009    PAP AKA CERVICAL CYTOLOGY  05/12/2011

## 2018-06-15 NOTE — PATIENT INSTRUCTIONS
Please contact our office if you have any questions about your visit today. Nitrofurantoin Macrocrystals (Macrodantin, Nitrofurantoin Macrocrystals) - (By mouth)   Why this medicine is used:   Treats urinary tract infections. Contact a nurse or doctor right away if you have:  · Blistering, peeling, or red skin rash  · Chest pain, trouble breathing  · Dark-colored urine or pale stools, yellow skin or eyes  · Numbness, tingling, or burning pain  · Cough, fever, chills, weakness  · Nausea, vomiting, loss of appetite, stomach pain, diarrhea     Common side effects:  · Dizziness, headache, or blurred vision, temporary hair loss  · Vaginal itching or discharge  © 2017 [address] is for End User's use only and may not be sold, redistributed or otherwise used for commercial purposes. Fluconazole (By mouth)   Fluconazole (bafk-FVX-h-zole)  Prevents and treats fungal infections. Brand Name(s): Diflucan   There may be other brand names for this medicine. When This Medicine Should Not Be Used: This medicine is not right for everyone. Do not use it if you had an allergic reaction to fluconazole, or if you are pregnant. How to Use This Medicine:   Liquid, Tablet  · Your doctor will tell you how much medicine to use. Do not use more than directed. · Oral liquid: Shake well just before each use. Measure the oral liquid medicine with a marked measuring spoon, oral syringe, or medicine cup. · Take all of the medicine in your prescription to clear up your infection, even if you feel better after the first few doses. · Read and follow the patient instructions that come with this medicine. Talk to your doctor or pharmacist if you have any questions. · Missed dose: Take a dose as soon as you remember. If it is almost time for your next dose, wait until then and take a regular dose. Do not take extra medicine to make up for a missed dose.   · Store the medicine in a closed container at room temperature, away from heat, moisture, and direct light. Store the oral liquid in the refrigerator or at room temperature and use it within 14 days. Do not freeze. Drugs and Foods to Avoid:   Ask your doctor or pharmacist before using any other medicine, including over-the-counter medicines, vitamins, and herbal products. · Do not use this medicine together with astemizole, cisapride, erythromycin, pimozide, quinidine, or terfenadine. · Some foods and medicines can affect how fluconazole works. Tell your doctor if you are using cimetidine, midazolam, prednisone, rifabutin, rifampin, theophylline, tofacitinib, triazolam, vitamin A supplements, or voriconazole. Also tell your doctor if you are using any of the following:   ¨ A blood thinner (such as warfarin)  ¨ A diuretic or \"water pill\" (such as hydrochlorothiazide), or blood pressure medicine (such as amlodipine, felodipine, isradipine, losartan, nifedipine)  ¨ Birth control pills  ¨ Cancer medicine (cyclophosphamide, vinblastine, vincristine)  ¨ Diabetes medicine that you take by mouth (glipizide, glyburide, tolbutamide)  ¨ Medicine to lower cholesterol (atorvastatin, fluvastatin, simvastatin)  ¨ Medicine to treat depression (amitriptyline, nortriptyline)  ¨ Medicine to treat HIV/AIDS (saquinavir, zidovudine)  ¨ Medicine to treat malaria (halofantrine)  ¨ Medicine to treat seizures (carbamazepine, phenytoin)  ¨ Medicine that weakens the immune system (cyclosporine, sirolimus, tacrolimus)  ¨ Narcotic pain medicine (alfentanil, fentanyl, methadone)  ¨ Pain or arthritis medicine (aspirin, celecoxib, diclofenac, ibuprofen, naproxen)  Warnings While Using This Medicine:   · It is not safe to take this medicine during pregnancy. It could harm an unborn baby. Tell your doctor right away if you become pregnant. · Tell your doctor if you are breastfeeding, or if you have kidney disease, liver disease, heart disease, heart rhythm problems, cancer, or HIV/AIDS.   · This medicine may cause the following problems:   ¨ Liver problems  ¨ Serious skin reactions  ¨ Changes in heart rhythm, such as a condition called QT prolongation  · This medicine may make you dizzy or drowsy. Do not drive or do anything that could be dangerous until you know how this medicine affects you. · Call your doctor if your symptoms do not improve or if they get worse. · Keep all medicine out of the reach of children. Never share your medicine with anyone. Possible Side Effects While Using This Medicine:   Call your doctor right away if you notice any of these side effects:  · Allergic reaction: Itching or hives, swelling in your face or hands, swelling or tingling in your mouth or throat, chest tightness, trouble breathing  · Blistering, peeling, or red skin rash  · Dark urine or pale stools, nausea, vomiting, loss of appetite, stomach pain, yellow skin or eyes  · Fast, pounding, or uneven heartbeat  · Unusual bleeding, bruising, or weakness  If you notice these less serious side effects, talk with your doctor:   · Headache  · Mild nausea, vomiting, stomach pain, or diarrhea  If you notice other side effects that you think are caused by this medicine, tell your doctor. Call your doctor for medical advice about side effects. You may report side effects to FDA at 0-968-FDA-7085  © 2017 [address] Information is for End User's use only and may not be sold, redistributed or otherwise used for commercial purposes. The above information is an  only. It is not intended as medical advice for individual conditions or treatments. Talk to your doctor, nurse or pharmacist before following any medical regimen to see if it is safe and effective for you. Fluconazole (Diflucan) - (By mouth)   Why this medicine is used:   Prevents and treats fungal infections.   Contact a nurse or doctor right away if you have:  · Blistering, peeling, red skin rash  · Fast, pounding, or uneven heartbeat; lightheadedness or fainting  · Dark urine or pale stools, vomiting, loss of appetite  · Yellow skin or eyes     Common side effects:  · Mild nausea, vomiting, stomach pain, diarrhea  · Headache  © 2017 [facility] Information is for End User's use only and may not be sold, redistributed or otherwise used for commercial purposes.

## 2018-06-15 NOTE — MR AVS SNAPSHOT
[address] [place] [address] 
 
 
 [address] [facility] [address] 
[phone] Patient: Marie Gavin MRN: [id number] CSM:[id number] Visit Information Date & Time Provider Department Dept. Phone Encounter #  
 6/15/2018  7:45 AM Andrew Salinas NP Carry Narendra Brownlee 77 [id number] Follow-up Instructions Return in about 2 weeks (around 6/29/2018), or if symptoms worsen or fail to improve. Upcoming Health Maintenance Date Due  
 FOOT EXAM Q1 5/12/2000 Pneumococcal 19-64 Medium Risk (1 of 1 - PPSV23) 5/12/2009 PAP AKA CERVICAL CYTOLOGY 5/12/2011 EYE EXAM RETINAL OR DILATED Q1 8/1/2018* Influenza Age 5 to Adult 8/1/2018 HEMOGLOBIN A1C Q6M 10/2/2018 LIPID PANEL Q1 4/2/2019 MICROALBUMIN Q1 5/18/2019 DTaP/Tdap/Td series (2 - Td) 4/2/2028 *Topic was postponed. The date shown is not the original due date. Allergies as of 6/15/2018  Review Complete On: 6/15/2018 By: Andrew Salinas NP No Known Allergies Current Immunizations  Never Reviewed No immunizations on file. Not reviewed this visit You Were Diagnosed With   
  
 Codes Comments Type 2 diabetes mellitus with complication, unspecified whether long term insulin use (HCC)    -  Primary ICD-10-CM: E11.8 ICD-9-CM: 250.90 Mixed hyperlipidemia     ICD-10-CM: E78.2 ICD-9-CM: 272.2 Essential hypertension     ICD-10-CM: I10 
ICD-9-CM: 401.9 Vitamin D deficiency     ICD-10-CM: E55.9 ICD-9-CM: 268.9 Urinary frequency     ICD-10-CM: R35.0 ICD-9-CM: 788.41 Urinary urgency     ICD-10-CM: R39.15 ICD-9-CM: 629.05 Urinary tract infection with hematuria, site unspecified     ICD-10-CM: N39.0, R31.9 ICD-9-CM: 599.0 Generalized headaches     ICD-10-CM: R51 ICD-9-CM: 626. 0 Vitals BP Pulse Temp Resp Height(growth percentile) Weight(growth percentile) 136/80 (BP 1 Location: Right arm, BP Patient Position: Sitting) (!) 107 98.1 °F (36.7 °C) (Oral) 16 5' 1\" (1.549 m) 201 lb (91.2 kg) LMP SpO2 BMI OB Status Smoking Status 06/15/2018 (Exact Date) 100% 37.98 kg/m2 Having regular periods Former Smoker BMI and BSA Data Body Mass Index Body Surface Area  
 37.98 kg/m 2 1.98 m 2 Preferred Pharmacy Pharmacy Name Phone [facility] [address], [address] [facility] Your Updated Medication List  
  
   
This list is accurate as of 6/15/18  8:43 AM.  Always use your most recent med list. amLODIPine 10 mg tablet Commonly known as:  Keny Linton Take 1 Tab by mouth daily. Blood-Glucose Meter monitoring kit Use once in the morning for fasting blood glucose and once two hours after a meal  
  
 ergocalciferol 50,000 unit capsule Commonly known as:  ERGOCALCIFEROL Take 1 Cap by mouth every seven (7) days. exenatide microspheres 2 mg/0.65 mL Pnij Commonly known as:  BYDUREON  
2 mg by SubCUTAneous route every seven (7) days. Indications: type 2 diabetes mellitus  
  
 fluconazole 150 mg tablet Commonly known as:  DIFLUCAN Take 1 Tab by mouth daily for 1 day. FDA advises cautious prescribing of oral fluconazole in pregnancy. glucose blood VI test strips strip Commonly known as:  blood glucose test  
Use once in the morning for fasting blood glucose and once two hours after a meal  
  
 hydroCHLOROthiazide 25 mg tablet Commonly known as:  HYDRODIURIL Take 1 Tab by mouth daily. insulin glargine 100 unit/mL (3 mL) Inpn Commonly known as:  LANTUSBASAGLAR  
10 units nightly  Indications: type 2 diabetes mellitus Insulin Needles (Disposable) 32 gauge x 1/4\" Ndle Commonly known as:  BD ULTRA-FINE MICRO PEN NEEDLE  
1 Box by Does Not Apply route two (2) times a day. lancets 32 gauge Misc 100 Each by Does Not Apply route two (2) times daily (with meals). metFORMIN 1,000 mg tablet Commonly known as:  GLUCOPHAGE Take 1 Tab by mouth two (2) times daily (with meals). nitrofurantoin 100 mg capsule Commonly known as:  MACRODANTIN Take 1 Cap by mouth two (2) times a day for 3 days. potassium chloride 10 mEq tablet Commonly known as:  KLOR-CON Take 1 Tab by mouth daily. pravastatin 20 mg tablet Commonly known as:  PRAVACHOL  
TAKE 1 TABLET BY MOUTH NIGHTLY Prescriptions Sent to Pharmacy Refills  
 pravastatin (PRAVACHOL) 20 mg tablet 1 Sig: TAKE 1 TABLET BY MOUTH NIGHTLY Class: Normal  
 Pharmacy: [address] Ph #: [phone]  
 hydroCHLOROthiazide (HYDRODIURIL) 25 mg tablet 5 Sig: Take 1 Tab by mouth daily. Class: Normal  
 Pharmacy: [address]  [address] Ph #: [phone] Route: Oral  
 amLODIPine (NORVASC) 10 mg tablet 5 Sig: Take 1 Tab by mouth daily. Class: Normal  
 Pharmacy: [address]  [address] Ph #: [phone] Route: Oral  
 potassium chloride (KLOR-CON) 10 mEq tablet 5 Sig: Take 1 Tab by mouth daily. Class: Normal  
 Pharmacy: [address]  [address] Ph #: [phone] Route: Oral  
 ergocalciferol (ERGOCALCIFEROL) 50,000 unit capsule 3 Sig: Take 1 Cap by mouth every seven (7) days. Class: Normal  
 Pharmacy: [address]  [address] Ph #: [phone] Route: Oral  
 nitrofurantoin (MACRODANTIN) 100 mg capsule 0 Sig: Take 1 Cap by mouth two (2) times a day for 3 days. Class: Normal  
 Pharmacy: [address]  [address] Ph #: [phone] Route: Oral  
 fluconazole (DIFLUCAN) 150 mg tablet 0 Sig: Take 1 Tab by mouth daily for 1 day.  FDA advises cautious prescribing of oral fluconazole in pregnancy. Class: Normal  
 Pharmacy: [facility] [address] MARRY MORENO [address] Ph #: [phone] Route: Oral  
  
We Performed the Following AMB POC URINALYSIS DIP STICK AUTO W/ MICRO [37792 CPT(R)] AMB POC URINALYSIS DIP STICK MANUAL W/O MICRO [03063 CPT(R)] Follow-up Instructions Return in about 2 weeks (around 6/29/2018), or if symptoms worsen or fail to improve. Patient Instructions Please contact our office if you have any questions about your visit today. Nitrofurantoin Macrocrystals (Macrodantin, Nitrofurantoin Macrocrystals) - (By mouth) Why this medicine is used:  
Treats urinary tract infections. Contact a nurse or doctor right away if you have: · Blistering, peeling, or red skin rash · Chest pain, trouble breathing · Dark-colored urine or pale stools, yellow skin or eyes · Numbness, tingling, or burning pain · Cough, fever, chills, weakness · Nausea, vomiting, loss of appetite, stomach pain, diarrhea Common side effects: 
· Dizziness, headache, or blurred vision, temporary hair loss · Vaginal itching or discharge © 2017 [address] Information is for End User's use only and may not be sold, redistributed or otherwise used for commercial purposes. Fluconazole (By mouth) Fluconazole (vigd-DXC-u-zole) Prevents and treats fungal infections. Brand Name(s): Diflucan There may be other brand names for this medicine. When This Medicine Should Not Be Used: This medicine is not right for everyone. Do not use it if you had an allergic reaction to fluconazole, or if you are pregnant. How to Use This Medicine:  
Liquid, Tablet · Your doctor will tell you how much medicine to use. Do not use more than directed. · Oral liquid: Shake well just before each use. Measure the oral liquid medicine with a marked measuring spoon, oral syringe, or medicine cup. · Take all of the medicine in your prescription to clear up your infection, even if you feel better after the first few doses. · Read and follow the patient instructions that come with this medicine. Talk to your doctor or pharmacist if you have any questions. · Missed dose: Take a dose as soon as you remember. If it is almost time for your next dose, wait until then and take a regular dose. Do not take extra medicine to make up for a missed dose. · Store the medicine in a closed container at room temperature, away from heat, moisture, and direct light. Store the oral liquid in the refrigerator or at room temperature and use it within 14 days. Do not freeze. Drugs and Foods to Avoid: Ask your doctor or pharmacist before using any other medicine, including over-the-counter medicines, vitamins, and herbal products. · Do not use this medicine together with astemizole, cisapride, erythromycin, pimozide, quinidine, or terfenadine. · Some foods and medicines can affect how fluconazole works. Tell your doctor if you are using cimetidine, midazolam, prednisone, rifabutin, rifampin, theophylline, tofacitinib, triazolam, vitamin A supplements, or voriconazole. Also tell your doctor if you are using any of the following: ¨ A blood thinner (such as warfarin) ¨ A diuretic or \"water pill\" (such as hydrochlorothiazide), or blood pressure medicine (such as amlodipine, felodipine, isradipine, losartan, nifedipine) ¨ Birth control pills ¨ Cancer medicine (cyclophosphamide, vinblastine, vincristine) ¨ Diabetes medicine that you take by mouth (glipizide, glyburide, tolbutamide) ¨ Medicine to lower cholesterol (atorvastatin, fluvastatin, simvastatin) ¨ Medicine to treat depression (amitriptyline, nortriptyline) ¨ Medicine to treat HIV/AIDS (saquinavir, zidovudine) ¨ Medicine to treat malaria (halofantrine) ¨ Medicine to treat seizures (carbamazepine, phenytoin) ¨ Medicine that weakens the immune system (cyclosporine, sirolimus, tacrolimus) ¨ Narcotic pain medicine (alfentanil, fentanyl, methadone) ¨ Pain or arthritis medicine (aspirin, celecoxib, diclofenac, ibuprofen, naproxen) Warnings While Using This Medicine: · It is not safe to take this medicine during pregnancy. It could harm an unborn baby. Tell your doctor right away if you become pregnant. · Tell your doctor if you are breastfeeding, or if you have kidney disease, liver disease, heart disease, heart rhythm problems, cancer, or HIV/AIDS. · This medicine may cause the following problems:  
¨ Liver problems ¨ Serious skin reactions ¨ Changes in heart rhythm, such as a condition called QT prolongation · This medicine may make you dizzy or drowsy. Do not drive or do anything that could be dangerous until you know how this medicine affects you. · Call your doctor if your symptoms do not improve or if they get worse. · Keep all medicine out of the reach of children. Never share your medicine with anyone. Possible Side Effects While Using This Medicine:  
Call your doctor right away if you notice any of these side effects: · Allergic reaction: Itching or hives, swelling in your face or hands, swelling or tingling in your mouth or throat, chest tightness, trouble breathing · Blistering, peeling, or red skin rash · Dark urine or pale stools, nausea, vomiting, loss of appetite, stomach pain, yellow skin or eyes · Fast, pounding, or uneven heartbeat · Unusual bleeding, bruising, or weakness If you notice these less serious side effects, talk with your doctor:  
· Headache · Mild nausea, vomiting, stomach pain, or diarrhea If you notice other side effects that you think are caused by this medicine, tell your doctor. Call your doctor for medical advice about side effects. You may report side effects to FDA at 2-851-FDA-4137 © 2017 [address] Information is for End User's use only and may not be sold, redistributed or otherwise used for commercial purposes. The above information is an  only. It is not intended as medical advice for individual conditions or treatments. Talk to your doctor, nurse or pharmacist before following any medical regimen to see if it is safe and effective for you. Fluconazole (Diflucan) - (By mouth) Why this medicine is used:  
Prevents and treats fungal infections. Contact a nurse or doctor right away if you have: · Blistering, peeling, red skin rash · Fast, pounding, or uneven heartbeat; lightheadedness or fainting · Dark urine or pale stools, vomiting, loss of appetite · Yellow skin or eyes Common side effects: · Mild nausea, vomiting, stomach pain, diarrhea 
· Headache © 2017 [address] Information is for End User's use only and may not be sold, redistributed or otherwise used for commercial purposes. Introducing [facility]! Benny Edwards introduces [employer] patient portal. Now you can access parts of your medical record, email your doctor's office, and request medication refills online. 1. In your internet browser, go to [email or web address] 2. Click on the First Time User? Click Here link in the Sign In box. You will see the New Member Sign Up page. 3. Enter your [employer] Access Code exactly as it appears below. You will not need to use this code after youve completed the sign-up process. If you do not sign up before the expiration date, you must request a new code. · [employer] Access Code: [email or web address] Expires: 7/1/2018  9:27 AM 
 
4. Enter the last four digits of your Social Security Number (xxxx) and Date of Birth (mm/dd/yyyy) as indicated and click Submit. You will be taken to the next sign-up page. 5. Create a [employer] ID. This will be your [employer] login ID and cannot be changed, so think of one that is secure and easy to remember. 6. Create a [employer] password. You can change your password at any time. 7. Enter your Password Reset Question and Answer. This can be used at a later time if you forget your password. 8. Enter your e-mail address. You will receive e-mail notification when new information is available in [address] 9. Click Sign Up. You can now view and download portions of your medical record. 10. Click the Download Summary menu link to download a portable copy of your medical information. If you have questions, please visit the Frequently Asked Questions section of the [employer] website. Remember, [employer] is NOT to be used for urgent needs. For medical emergencies, dial 911. Now available from your iPhone and Android! Please provide this summary of care documentation to your next provider. Your primary care clinician is listed as [facility]. If you have any questions after today's visit, please call [phone].

## 2018-06-15 NOTE — PROGRESS NOTES
JULEE  Marie Gavin is a 29 y.o. female  Chief Complaint   Patient presents with    Follow-up    Medication Evaluation     Patient would like to get the metforming 500 mg tablet instead of having to cut the 1000 mg tab in half     Patient states her blood glucose was 150 this morning. Reports she is seeing the endocrinologist. Reports she has to return in 2-3 weeks. Reports she does get a headache the day following he Bydureon injection. Reports she has seen the podiatrist.   Colin Herzog to diarrhea from the metformin but states this has improved since the endocrinologist made adjustments. Denies hyper or hypoglycemic episodes. Reports she does have urinary frequency and urgency and wants to know if she has a UTI. Denies pain on urination or blood in urine. Denies fevers or chills. Hypertension - reports she has brought a blood pressure cuff and her blood pressure has been down. Denies chest pain, shortness of breath, palpitations, dizziness, or blurred vision. Denies leg swelling. Reports taking medications as prescribed. Vitamin D - taking as prescribed. Denies leg pains. Past Medical History  No past medical history on file. Surgical History  No past surgical history on file. Medications  Current Outpatient Prescriptions   Medication Sig Dispense Refill    pravastatin (PRAVACHOL) 20 mg tablet TAKE 1 TABLET BY MOUTH NIGHTLY 90 Tab 1    hydroCHLOROthiazide (HYDRODIURIL) 25 mg tablet Take 1 Tab by mouth daily. 30 Tab 5    amLODIPine (NORVASC) 10 mg tablet Take 1 Tab by mouth daily. 30 Tab 5    potassium chloride (KLOR-CON) 10 mEq tablet Take 1 Tab by mouth daily. 30 Tab 5    ergocalciferol (ERGOCALCIFEROL) 50,000 unit capsule Take 1 Cap by mouth every seven (7) days. 12 Cap 3    nitrofurantoin (MACRODANTIN) 100 mg capsule Take 1 Cap by mouth two (2) times a day for 3 days. 6 Cap 0    fluconazole (DIFLUCAN) 150 mg tablet Take 1 Tab by mouth daily for 1 day.  FDA advises cautious prescribing of oral fluconazole in pregnancy. 1 Tab 0    metFORMIN (GLUCOPHAGE) 1,000 mg tablet Take 1 Tab by mouth two (2) times daily (with meals). (Patient taking differently: Take 500 mg by mouth two (2) times daily (with meals). ) 30 Tab 0    exenatide microspheres (BYDUREON) 2 mg/0.65 mL pnij 2 mg by SubCUTAneous route every seven (7) days. Indications: type 2 diabetes mellitus 0.65 mL 3    insulin glargine (LANTUS,BASAGLAR) 100 unit/mL (3 mL) inpn 10 units nightly  Indications: type 2 diabetes mellitus 3 Pen 0    Insulin Needles, Disposable, (BD ULTRA-FINE MICRO PEN NEEDLE) 32 gauge x 1/4\" ndle 1 Box by Does Not Apply route two (2) times a day. 100 Pen Needle 0    Blood-Glucose Meter monitoring kit Use once in the morning for fasting blood glucose and once two hours after a meal 1 Kit 0    glucose blood VI test strips (BLOOD GLUCOSE TEST) strip Use once in the morning for fasting blood glucose and once two hours after a meal 100 Strip 0    lancets 32 gauge misc 100 Each by Does Not Apply route two (2) times daily (with meals). 100 Lancet 0       Allergies  No Known Allergies    Family History  Family History   Problem Relation Age of Onset    Hypertension Mother     Hypertension Father     Hypertension Paternal Grandmother     Hypertension Paternal Grandfather        Social History  Social History     Social History    Marital status: SINGLE     Spouse name: N/A    Number of children: N/A    Years of education: N/A     Occupational History    Not on file.      Social History Main Topics    Smoking status: Former Smoker     Start date: 4/2/2008     Quit date: 6/2/2013    Smokeless tobacco: Never Used    Alcohol use 1.2 - 1.8 oz/week     1 - 2 Glasses of wine, 0 Cans of beer, 1 Shots of liquor, 0 Standard drinks or equivalent per week    Drug use: No    Sexual activity: Yes     Partners: Male     Birth control/ protection: Condom     Other Topics Concern    Not on file     Social History Narrative Problem List  Patient Active Problem List   Diagnosis Code    Controlled diabetes mellitus type 2 with complications ([facility] W57.1    Mixed hyperlipidemia E78.2    Essential hypertension I10    Vitamin D deficiency E55.9    Severe obesity (BMI 35.0-39. 9) with comorbidity ([facility] E66.01    Type 2 diabetes with nephropathy ([facility] E11.21       Review of Systems  Review of Systems   Eyes: Negative for blurred vision. Respiratory: Negative for shortness of breath. Cardiovascular: Negative for chest pain and palpitations. Gastrointestinal: Positive for diarrhea. Negative for abdominal pain, nausea and vomiting. Genitourinary: Positive for frequency and urgency. Negative for dysuria and hematuria. Neurological: Positive for headaches. Negative for dizziness, loss of consciousness and weakness. Vital Signs  Vitals:    06/15/18 0804   BP: 136/80   Pulse: (!) 107   Resp: 16   Temp: 98.1 °F (36.7 °C)   TempSrc: Oral   SpO2: 100%   Weight: 201 lb (91.2 kg)   Height: 5' 1\" (1.549 m)   PainSc:   0 - No pain   LMP: 06/15/2018       Physical Exam  Physical Exam   Constitutional: She is oriented to person, place, and time. HENT:   Mouth/Throat: Oropharynx is clear and moist.   Eyes: Pupils are equal, round, and reactive to light. Cardiovascular: Regular rhythm and normal heart sounds. Tachycardia present. Pulmonary/Chest: Effort normal and breath sounds normal. No respiratory distress. She has no wheezes. Abdominal: Soft. Bowel sounds are normal. She exhibits no distension. There is no tenderness. Neurological: She is alert and oriented to person, place, and time. Psychiatric: She has a normal mood and affect. Her behavior is normal.   Vitals reviewed.       Diagnostics  Orders Placed This Encounter    CULTURE, URINE     Standing Status:   Future     Number of Occurrences:   1     Standing Expiration Date:   6/16/2019    AMB POC URINALYSIS DIP STICK MANUAL W/O MICRO    pravastatin (PRAVACHOL) 20 mg tablet     Sig: TAKE 1 TABLET BY MOUTH NIGHTLY     Dispense:  90 Tab     Refill:  1     Please consider 90 day supplies to promote better adherence    hydroCHLOROthiazide (HYDRODIURIL) 25 mg tablet     Sig: Take 1 Tab by mouth daily. Dispense:  30 Tab     Refill:  5    amLODIPine (NORVASC) 10 mg tablet     Sig: Take 1 Tab by mouth daily. Dispense:  30 Tab     Refill:  5    potassium chloride (KLOR-CON) 10 mEq tablet     Sig: Take 1 Tab by mouth daily. Dispense:  30 Tab     Refill:  5    ergocalciferol (ERGOCALCIFEROL) 50,000 unit capsule     Sig: Take 1 Cap by mouth every seven (7) days. Dispense:  12 Cap     Refill:  3    nitrofurantoin (MACRODANTIN) 100 mg capsule     Sig: Take 1 Cap by mouth two (2) times a day for 3 days. Dispense:  6 Cap     Refill:  0    fluconazole (DIFLUCAN) 150 mg tablet     Sig: Take 1 Tab by mouth daily for 1 day. FDA advises cautious prescribing of oral fluconazole in pregnancy. Dispense:  1 Tab     Refill:  0       Results  Results for orders placed or performed in visit on 06/15/18   AMB POC URINALYSIS DIP STICK MANUAL W/O MICRO   Result Value Ref Range    Color (UA POC) Yellow     Clarity (UA POC) Clear     Glucose (UA POC) Negative Negative    Bilirubin (UA POC) Negative Negative    Ketones (UA POC) Negative Negative    Specific gravity (UA POC) 1.010 1.001 - 1.035    Blood (UA POC) 3+ Negative    pH (UA POC) 5.5 4.6 - 8.0    Protein (UA POC) Negative Negative    Urobilinogen (UA POC) 0.2 mg/dL 0.2 - 1    Nitrites (UA POC) Negative Negative    Leukocyte esterase (UA POC) Trace Negative         Assessment and Plan  Diagnoses and all orders for this visit:    1. Type 2 diabetes mellitus with complication, unspecified whether long term insulin use (HCC)  -     AMB POC URINALYSIS DIP STICK MANUAL W/O MICRO    2.  Mixed hyperlipidemia  -     pravastatin (PRAVACHOL) 20 mg tablet; TAKE 1 TABLET BY MOUTH NIGHTLY    3. Essential hypertension  - hydroCHLOROthiazide (HYDRODIURIL) 25 mg tablet; Take 1 Tab by mouth daily. -     amLODIPine (NORVASC) 10 mg tablet; Take 1 Tab by mouth daily. -     potassium chloride (KLOR-CON) 10 mEq tablet; Take 1 Tab by mouth daily. 4. Vitamin D deficiency  -     ergocalciferol (ERGOCALCIFEROL) 50,000 unit capsule; Take 1 Cap by mouth every seven (7) days. 5. Urinary frequency    6. Urinary urgency    7. Urinary tract infection with hematuria, site unspecified  -     nitrofurantoin (MACRODANTIN) 100 mg capsule; Take 1 Cap by mouth two (2) times a day for 3 days. -     CULTURE, URINE; Future    8. Generalized headaches    Other orders  -     fluconazole (DIFLUCAN) 150 mg tablet; Take 1 Tab by mouth daily for 1 day. FDA advises cautious prescribing of oral fluconazole in pregnancy. OTC tylenol for headaches. Follow up with endocrinology as instructed. Medication, side effects, possible allergic reactions and warnings reviewed with patient. Patient verbalized understanding. Patient hesitant to take antibiotic if she does not have a diflucan as she has a history of yeast infections. After care summary printed and reviewed with patient. Plan reviewed with patient. Questions answered. Patient verbalized understanding of plan and is in agreement with plan. Patient to follow up in two weeks or earlier if symptoms worsen.     CHRIST Lerma

## 2018-06-16 LAB — RESULT: NORMAL

## 2018-07-09 DIAGNOSIS — Z76.89 ENCOUNTER TO ESTABLISH CARE: ICD-10-CM

## 2018-07-10 RX ORDER — METFORMIN HYDROCHLORIDE 1000 MG/1
TABLET ORAL
Qty: 30 TAB | Refills: 0 | Status: SHIPPED | OUTPATIENT
Start: 2018-07-10 | End: 2018-08-22 | Stop reason: ALTCHOICE

## 2018-07-10 RX ORDER — HYDROCHLOROTHIAZIDE 25 MG/1
TABLET ORAL
Qty: 30 TAB | Refills: 0 | Status: SHIPPED | OUTPATIENT
Start: 2018-07-10 | End: 2019-02-04 | Stop reason: ALTCHOICE

## 2018-07-10 NOTE — TELEPHONE ENCOUNTER
Patient pharmacy has sent a refill request for the prescription on HCTZ 25 mg 9 6/15/2018 30 tabs. Patient last seen in office 6/15/2018 by PCP.

## 2018-08-20 ENCOUNTER — DOCUMENTATION ONLY (OUTPATIENT)
Dept: FAMILY MEDICINE CLINIC | Age: 28
End: 2018-08-20

## 2018-08-20 PROBLEM — E11.3299 MILD NONPROLIFERATIVE DIABETIC RETINOPATHY ASSOCIATED WITH TYPE 2 DIABETES MELLITUS (HCC): Status: ACTIVE | Noted: 2018-08-20

## 2018-08-22 ENCOUNTER — OFFICE VISIT (OUTPATIENT)
Dept: FAMILY MEDICINE CLINIC | Age: 28
End: 2018-08-22

## 2018-08-22 VITALS
DIASTOLIC BLOOD PRESSURE: 87 MMHG | HEART RATE: 103 BPM | SYSTOLIC BLOOD PRESSURE: 130 MMHG | WEIGHT: 201.75 LBS | HEIGHT: 61 IN | TEMPERATURE: 97.4 F | RESPIRATION RATE: 20 BRPM | BODY MASS INDEX: 38.09 KG/M2

## 2018-08-22 DIAGNOSIS — E55.9 VITAMIN D DEFICIENCY: ICD-10-CM

## 2018-08-22 DIAGNOSIS — I10 ESSENTIAL HYPERTENSION: ICD-10-CM

## 2018-08-22 DIAGNOSIS — Z79.4 CONTROLLED TYPE 2 DIABETES MELLITUS WITH COMPLICATION, WITH LONG-TERM CURRENT USE OF INSULIN (HCC): Primary | ICD-10-CM

## 2018-08-22 DIAGNOSIS — E78.2 MIXED HYPERLIPIDEMIA: ICD-10-CM

## 2018-08-22 DIAGNOSIS — E11.8 CONTROLLED TYPE 2 DIABETES MELLITUS WITH COMPLICATION, WITH LONG-TERM CURRENT USE OF INSULIN (HCC): Primary | ICD-10-CM

## 2018-08-22 RX ORDER — METFORMIN HYDROCHLORIDE 500 MG/1
TABLET ORAL 2 TIMES DAILY WITH MEALS
COMMUNITY

## 2018-08-22 NOTE — MR AVS SNAPSHOT
[address] [place] [address] 
 
 
 [address] [id number] [address] [address] [phone] Patient: Edward Iraheta MRN: [id number] HKN:[id number] Visit Information Date & Time Provider Department Dept. Phone Encounter #  
 8/22/2018  8:30 AM Lucy Gerber NP [facility] Narend[facility] Brownlee 77 [id number] Follow-up Instructions Return in about 3 months (around 11/22/2018), or if symptoms worsen or fail to improve. Upcoming Health Maintenance Date Due  
 FOOT EXAM Q1 5/12/2000 EYE EXAM RETINAL OR DILATED Q1 5/12/2000 Pneumococcal 19-64 Medium Risk (1 of 1 - PPSV23) 5/12/2009 PAP AKA CERVICAL CYTOLOGY 5/12/2011 Influenza Age 5 to Adult 9/22/2018* HEMOGLOBIN A1C Q6M 10/2/2018 LIPID PANEL Q1 4/2/2019 MICROALBUMIN Q1 5/18/2019 DTaP/Tdap/Td series (2 - Td) 4/2/2028 *Topic was postponed. The date shown is not the original due date. Allergies as of 8/22/2018  Review Complete On: 6/15/2018 By: Lucy Gerber NP No Known Allergies Current Immunizations  Never Reviewed No immunizations on file. Not reviewed this visit You Were Diagnosed With   
  
 Codes Comments Controlled type 2 diabetes mellitus with complication, with long-term current use of insulin (HCC)    -  Primary ICD-10-CM: E11.8, Z79.4 ICD-9-CM: 250.90, V58.67 Vitamin D deficiency     ICD-10-CM: E55.9 ICD-9-CM: 268.9 Essential hypertension     ICD-10-CM: I10 
ICD-9-CM: 401.9 Mixed hyperlipidemia     ICD-10-CM: E78.2 ICD-9-CM: 272.2 BMI 38.0-38.9,adult     ICD-10-CM: A12.37 
ICD-9-CM: V85.38 Class 2 obesity with serious comorbidity and body mass index (BMI) of 38.0 to 38.9 in adult, unspecified obesity type     ICD-10-CM: E66.9, Z68.38 
ICD-9-CM: 278.00, V85.38 Vitals BP Pulse Temp Resp Height(growth percentile) Weight(growth percentile)  130/87 (BP 1 Location: Left arm, BP Patient Position: Sitting) (!) 103 97.4 °F (36.3 °C) (Oral) 20 5' 1\" (1.549 m) 201 lb 12 oz (91.5 kg) BMI OB Status Smoking Status 38.12 kg/m2 Having regular periods Former Smoker BMI and BSA Data Body Mass Index Body Surface Area  
 38.12 kg/m 2 1.98 m 2 Preferred Pharmacy Pharmacy Name Phone Teresa Lombardo [address], [address] [facility] Your Updated Medication List  
  
   
This list is accurate as of 8/22/18  9:03 AM.  Always use your most recent med list. amLODIPine 10 mg tablet Commonly known as:  Ivania Darting Take 1 Tab by mouth daily. Blood-Glucose Meter monitoring kit Use once in the morning for fasting blood glucose and once two hours after a meal  
  
 ergocalciferol 50,000 unit capsule Commonly known as:  ERGOCALCIFEROL Take 1 Cap by mouth every seven (7) days. exenatide microspheres 2 mg/0.65 mL Pnij Commonly known as:  BYDUREON  
2 mg by SubCUTAneous route every seven (7) days. Indications: type 2 diabetes mellitus  
  
 glucose blood VI test strips strip Commonly known as:  blood glucose test  
Use once in the morning for fasting blood glucose and once two hours after a meal  
  
 hydroCHLOROthiazide 25 mg tablet Commonly known as:  HYDRODIURIL  
TAKE 1/2 (ONE-HALF) TABLET BY MOUTH  DAILY  
  
 insulin glargine 100 unit/mL (3 mL) Inpn Commonly known as:  LANTUS,BASAGLAR  
10 units nightly  Indications: type 2 diabetes mellitus Insulin Needles (Disposable) 32 gauge x 1/4\" Ndle Commonly known as:  BD ULTRA-FINE MICRO PEN NEEDLE  
1 Box by Does Not Apply route two (2) times a day. lancets 32 gauge Misc  
100 Each by Does Not Apply route two (2) times daily (with meals). metFORMIN 500 mg tablet Commonly known as:  GLUCOPHAGE Take  by mouth two (2) times daily (with meals). potassium chloride 10 mEq tablet Commonly known as:  KLOR-CON Take 1 Tab by mouth daily. pravastatin 20 mg tablet Commonly known as:  PRAVACHOL  
TAKE 1 TABLET BY MOUTH NIGHTLY Follow-up Instructions Return in about 3 months (around 11/22/2018), or if symptoms worsen or fail to improve. To-Do List   
 08/22/2018 Lab:  CBC WITH AUTOMATED DIFF   
  
 08/22/2018 Lab:  T4, FREE   
  
 08/22/2018 Lab:  VITAMIN D, 25 HYDROXY Around 11/20/2018 Lab:  LIPID PANEL Around 11/20/2018 Lab:  METABOLIC PANEL, COMPREHENSIVE Around 11/20/2018 Lab:  TSH 3RD GENERATION Patient Instructions Please contact our office if you have any questions about your visit today. Introducing [facility]! Braulio Garcia introduces [employer] patient portal. Now you can access parts of your medical record, email your doctor's office, and request medication refills online. 1. In your internet browser, go to [email or web address] 2. Click on the First Time User? Click Here link in the Sign In box. You will see the New Member Sign Up page. 3. Enter your [employer] Access Code exactly as it appears below. You will not need to use this code after youve completed the sign-up process. If you do not sign up before the expiration date, you must request a new code. · [employer] Access Code: [email or web address] Expires: 11/20/2018  9:00 AM 
 
4. Enter the last four digits of your Social Security Number (xxxx) and Date of Birth (mm/dd/yyyy) as indicated and click Submit. You will be taken to the next sign-up page. 5. Create a [employer] ID. This will be your [employer] login ID and cannot be changed, so think of one that is secure and easy to remember. 6. Create a [employer] password. You can change your password at any time. 7. Enter your Password Reset Question and Answer. This can be used at a later time if you forget your password. 8. Enter your e-mail address. You will receive e-mail notification when new information is available in [address] 9. Click Sign Up. You can now view and download portions of your medical record. 10. Click the Download Summary menu link to download a portable copy of your medical information. If you have questions, please visit the Frequently Asked Questions section of the [facility] [employer] website. Remember, [facility] is NOT to be used for urgent needs. For medical emergencies, dial 911. Now available from your iPhone and Android! Please provide this summary of care documentation to your next provider. Your primary care clinician is listed as Erika Garcia. If you have any questions after today's visit, please call [phone].

## 2018-08-22 NOTE — PROGRESS NOTES
Chief Complaint   Patient presents with    Diabetes    Cholesterol Problem     high chol    Hypertension    Vitamin D Deficiency       Health Maintenance Due   Topic Date Due    FOOT EXAM Q1  05/12/2000    EYE EXAM RETINAL OR DILATED Q1  05/12/2000    Pneumococcal 19-64 Medium Risk (1 of 1 - PPSV23) 05/12/2009    PAP AKA CERVICAL CYTOLOGY  05/12/2011    Influenza Age 9 to Adult  08/01/2018       Health Maintenance reviewed       1. Have you been to the ER, urgent care clinic since your last visit? Hospitalized since your last visit? No    2. Have you seen or consulted any other health care providers outside of the [facility] since your last visit? Include any pap smears or colon screening.  No      Abuse screening updated

## 2018-08-22 NOTE — PROGRESS NOTES
HPI  Anna Meehan is a 29 y.o. female  Chief Complaint   Patient presents with    Diabetes    Cholesterol Problem     high chol    Hypertension    Vitamin D Deficiency     Reports her diet has improved and she feels much better. Reports her last hemoglobin A1C is 5.5. Reports she is still seeing endocrinology. Reports one hypoglycemic episode when she did not eat as she had to work late. Hypertension -Denies chest pain or shortness of breath. Reports she is taking her blood pressure medications as indicated. Vitamin D - Reports she is taking her vitamin d daily. Denies leg pains. Denies desire to hurt or harm herself or others. Reports her weight has not changed. Reports she is eating better and exercising. Past Medical History  No past medical history on file. Surgical History  No past surgical history on file. Medications  Current Outpatient Prescriptions   Medication Sig Dispense Refill    metFORMIN (GLUCOPHAGE) 500 mg tablet Take  by mouth two (2) times daily (with meals).  hydroCHLOROthiazide (HYDRODIURIL) 25 mg tablet TAKE 1/2 (ONE-HALF) TABLET BY MOUTH  DAILY 30 Tab 0    pravastatin (PRAVACHOL) 20 mg tablet TAKE 1 TABLET BY MOUTH NIGHTLY 90 Tab 1    amLODIPine (NORVASC) 10 mg tablet Take 1 Tab by mouth daily. 30 Tab 5    potassium chloride (KLOR-CON) 10 mEq tablet Take 1 Tab by mouth daily. 30 Tab 5    ergocalciferol (ERGOCALCIFEROL) 50,000 unit capsule Take 1 Cap by mouth every seven (7) days. 12 Cap 3    exenatide microspheres (BYDUREON) 2 mg/0.65 mL pnij 2 mg by SubCUTAneous route every seven (7) days.  Indications: type 2 diabetes mellitus 0.65 mL 3    insulin glargine (LANTUS,BASAGLAR) 100 unit/mL (3 mL) inpn 10 units nightly  Indications: type 2 diabetes mellitus (Patient taking differently: 12 units nightly  Indications: type 2 diabetes mellitus) 3 Pen 0    Insulin Needles, Disposable, (BD ULTRA-FINE MICRO PEN NEEDLE) 32 gauge x 1/4\" ndle 1 Box by Does Not Apply route two (2) times a day. 100 Pen Needle 0    Blood-Glucose Meter monitoring kit Use once in the morning for fasting blood glucose and once two hours after a meal 1 Kit 0    glucose blood VI test strips (BLOOD GLUCOSE TEST) strip Use once in the morning for fasting blood glucose and once two hours after a meal 100 Strip 0    lancets 32 gauge misc 100 Each by Does Not Apply route two (2) times daily (with meals). 100 Lancet 0       Allergies  No Known Allergies    Family History  Family History   Problem Relation Age of Onset    Hypertension Mother     Hypertension Father     Hypertension Paternal Grandmother     Hypertension Paternal Grandfather        Social History  Social History     Social History    Marital status: SINGLE     Spouse name: N/A    Number of children: N/A    Years of education: N/A     Occupational History    Not on file. Social History Main Topics    Smoking status: Former Smoker     Start date: 4/2/2008     Quit date: 6/2/2013    Smokeless tobacco: Never Used    Alcohol use 1.2 - 1.8 oz/week     1 - 2 Glasses of wine, 0 Cans of beer, 1 Shots of liquor, 0 Standard drinks or equivalent per week    Drug use: No    Sexual activity: Yes     Partners: Male     Birth control/ protection: Condom     Other Topics Concern    Not on file     Social History Narrative       Problem List  Patient Active Problem List   Diagnosis Code    Controlled diabetes mellitus type 2 with complications ([facility] G63.6    Mixed hyperlipidemia E78.2    Essential hypertension I10    Vitamin D deficiency E55.9    Severe obesity (BMI 35.0-39. 9) with comorbidity ([facility] E66.01    Type 2 diabetes with nephropathy ([facility] E11.21    Mild nonproliferative diabetic retinopathy associated with type 2 diabetes mellitus ([facility] E11.3299       Review of Systems  Review of Systems   Constitutional: Negative for chills and fever. Respiratory: Negative for shortness of breath.     Cardiovascular: Negative for chest pain and palpitations. Gastrointestinal: Negative for abdominal pain, nausea and vomiting. Musculoskeletal: Negative for falls. Skin: Negative for itching and rash. Psychiatric/Behavioral: Negative for depression and suicidal ideas. Vital Signs  Vitals:    08/22/18 0839   BP: 130/87   Pulse: (!) 103   Resp: 20   Temp: 97.4 °F (36.3 °C)   TempSrc: Oral   Weight: 201 lb 12 oz (91.5 kg)   Height: 5' 1\" (1.549 m)   PainSc:   0 - No pain       Physical Exam  Physical Exam   Constitutional: She is oriented to person, place, and time. HENT:   Mouth/Throat: Oropharynx is clear and moist.   Eyes: Pupils are equal, round, and reactive to light. Cardiovascular: Regular rhythm and intact distal pulses. Tachycardia present. No murmur heard. Pulmonary/Chest: Effort normal and breath sounds normal. No respiratory distress. Abdominal: Soft. Bowel sounds are normal. She exhibits no distension. There is no tenderness. There is no rebound and no guarding. Neurological: She is alert and oriented to person, place, and time. Psychiatric: She has a normal mood and affect. Her behavior is normal. Judgment and thought content normal.   Vitals reviewed.       Diagnostics  Orders Placed This Encounter    CBC WITH AUTOMATED DIFF     Standing Status:   Future     Number of Occurrences:   1     Standing Expiration Date:   8/55/1342    METABOLIC PANEL, COMPREHENSIVE     Standing Status:   Future     Number of Occurrences:   1     Standing Expiration Date:   2/19/2019    LIPID PANEL     Standing Status:   Future     Number of Occurrences:   1     Standing Expiration Date:   2/19/2019    VITAMIN D, 25 HYDROXY     Standing Status:   Future     Number of Occurrences:   1     Standing Expiration Date:   8/22/2019    TSH 3RD GENERATION     Standing Status:   Future     Number of Occurrences:   1     Standing Expiration Date:   2/19/2019    T4, FREE     Standing Status:   Future     Number of Occurrences:   1     Standing Expiration Date:   8/23/2019    metFORMIN (GLUCOPHAGE) 500 mg tablet     Sig: Take  by mouth two (2) times daily (with meals). Results  Results for orders placed or performed in visit on 06/15/18   CULTURE, URINE   Result Value Ref Range    RESULT Normal    AMB POC URINALYSIS DIP STICK MANUAL W/O MICRO   Result Value Ref Range    Color (UA POC) Yellow     Clarity (UA POC) Clear     Glucose (UA POC) Negative Negative    Bilirubin (UA POC) Negative Negative    Ketones (UA POC) Negative Negative    Specific gravity (UA POC) 1.010 1.001 - 1.035    Blood (UA POC) 3+ Negative    pH (UA POC) 5.5 4.6 - 8.0    Protein (UA POC) Negative Negative    Urobilinogen (UA POC) 0.2 mg/dL 0.2 - 1    Nitrites (UA POC) Negative Negative    Leukocyte esterase (UA POC) Trace Negative           Assessment and Plan  Diagnoses and all orders for this visit:    1. Controlled type 2 diabetes mellitus with complication, with long-term current use of insulin (HCC)  -     CBC WITH AUTOMATED DIFF; Future  -     METABOLIC PANEL, COMPREHENSIVE; Future  -     LIPID PANEL; Future    2. Vitamin D deficiency  -     VITAMIN D, 25 HYDROXY; Future    3. Essential hypertension  -     CBC WITH AUTOMATED DIFF; Future  -     METABOLIC PANEL, COMPREHENSIVE; Future  -     LIPID PANEL; Future    4. Mixed hyperlipidemia  -     CBC WITH AUTOMATED DIFF; Future  -     METABOLIC PANEL, COMPREHENSIVE; Future  -     LIPID PANEL; Future    5. BMI 38.0-38.9,adult    6. Class 2 obesity with serious comorbidity and body mass index (BMI) of 38.0 to 38.9 in adult, unspecified obesity type  -     TSH 3RD GENERATION; Future  -     T4, FREE; Future      Follow up with endocrinology as indicated. After care summary printed and reviewed with patient. Plan reviewed with patient. Questions answered. Patient verbalized understanding of plan and is in agreement with plan. Patient to follow up in three months or earlier if symptoms worsen.      CHRIST Barrientos

## 2018-08-23 LAB
25(OH)D3 SERPL-MCNC: 27.5 NG/ML (ref 32–100)
A-G RATIO,AGRAT: 1.5 RATIO (ref 1.1–2.6)
ABSOLUTE LYMPHOCYTE COUNT, 10803: 2.6 K/UL (ref 1–4.8)
ALBUMIN SERPL-MCNC: 4.8 G/DL (ref 3.5–5)
ALP SERPL-CCNC: 69 U/L (ref 25–115)
ALT SERPL-CCNC: 20 U/L (ref 5–40)
ANION GAP SERPL CALC-SCNC: 24 MMOL/L
AST SERPL W P-5'-P-CCNC: 14 U/L (ref 10–37)
BASOPHILS # BLD: 0 K/UL (ref 0–0.2)
BASOPHILS NFR BLD: 0 % (ref 0–2)
BILIRUB SERPL-MCNC: 0.2 MG/DL (ref 0.2–1.2)
BUN SERPL-MCNC: 9 MG/DL (ref 6–22)
CALCIUM SERPL-MCNC: 9.9 MG/DL (ref 8.4–10.5)
CHLORIDE SERPL-SCNC: 95 MMOL/L (ref 98–110)
CHOLEST SERPL-MCNC: 206 MG/DL (ref 110–200)
CO2 SERPL-SCNC: 22 MMOL/L (ref 20–32)
CREAT SERPL-MCNC: 0.6 MG/DL (ref 0.5–1.2)
EOSINOPHIL # BLD: 0.1 K/UL (ref 0–0.5)
EOSINOPHIL NFR BLD: 1 % (ref 0–6)
ERYTHROCYTE [DISTWIDTH] IN BLOOD BY AUTOMATED COUNT: 15 % (ref 10–15.5)
GFRAA, 66117: >60
GFRNA, 66118: >60
GLOBULIN,GLOB: 3.3 G/DL (ref 2–4)
GLUCOSE SERPL-MCNC: 117 MG/DL (ref 70–99)
GRANULOCYTES,GRANS: 58 % (ref 40–75)
HCT VFR BLD AUTO: 37.5 % (ref 35.1–46.5)
HDLC SERPL-MCNC: 31 MG/DL (ref 40–59)
HDLC SERPL-MCNC: 6.6 MG/DL (ref 0–5)
HGB BLD-MCNC: 11.5 G/DL (ref 11.7–15.5)
LDLC SERPL CALC-MCNC: 134 MG/DL (ref 50–99)
LYMPHOCYTES, LYMLT: 35 % (ref 20–45)
MCH RBC QN AUTO: 28 PG (ref 26–34)
MCHC RBC AUTO-ENTMCNC: 31 G/DL (ref 31–36)
MCV RBC AUTO: 90 FL (ref 80–95)
MONOCYTES # BLD: 0.4 K/UL (ref 0.1–1)
MONOCYTES NFR BLD: 5 % (ref 3–12)
NEUTROPHILS # BLD AUTO: 4.5 K/UL (ref 1.8–7.7)
PLATELET # BLD AUTO: 388 K/UL (ref 140–440)
PMV BLD AUTO: 10.3 FL (ref 9–13)
POTASSIUM SERPL-SCNC: 3.8 MMOL/L (ref 3.5–5.5)
PROT SERPL-MCNC: 8.1 G/DL (ref 6.4–8.3)
RBC # BLD AUTO: 4.16 M/UL (ref 3.8–5.2)
SODIUM SERPL-SCNC: 141 MMOL/L (ref 133–145)
T4 FREE SERPL-MCNC: 1.3 NG/DL (ref 0.9–1.8)
TRIGL SERPL-MCNC: 205 MG/DL (ref 40–149)
TSH SERPL DL<=0.005 MIU/L-ACNC: 3.72 MCU/ML (ref 0.27–4.2)
VLDLC SERPL CALC-MCNC: 41 MG/DL (ref 8–30)
WBC # BLD AUTO: 7.7 K/UL (ref 4–11)

## 2018-08-23 NOTE — PROGRESS NOTES
Inform patient that her triglycerides, total cholesterol, and bad cholesterol have all gone down. She should continue with her diet modifications and seeing dietician. She needs to increase her exercise to 3-4x a week at 45 min intervals as her good cholesterol has gone down. Exercise will help her cholesterol levels. She needs to have OTC vitamin D at 2000 units daily as her vitamin D has improved but is still low. All other labs are normal, near normal, or of no clinical concern.  [facility]

## 2018-12-27 DIAGNOSIS — I10 ESSENTIAL HYPERTENSION: ICD-10-CM

## 2018-12-27 RX ORDER — AMLODIPINE BESYLATE 10 MG/1
TABLET ORAL
Qty: 30 TAB | Refills: 5 | Status: SHIPPED | OUTPATIENT
Start: 2018-12-27 | End: 2019-07-05 | Stop reason: SDUPTHER

## 2018-12-27 NOTE — TELEPHONE ENCOUNTER
PCP: Clemente Lee NP    Last appt: 8/22/2018  No future appointments.     Requested Prescriptions     Pending Prescriptions Disp Refills    amLODIPine (NORVASC) 10 mg tablet [Pharmacy Med Name: AMLODIPINE 10MG TAB] 30 Tab 5     Sig: TAKE 1 TABLET BY MOUTH ONCE DAILY

## 2019-01-23 ENCOUNTER — OFFICE VISIT (OUTPATIENT)
Dept: FAMILY MEDICINE CLINIC | Age: 29
End: 2019-01-23

## 2019-01-23 VITALS
RESPIRATION RATE: 16 BRPM | BODY MASS INDEX: 37.57 KG/M2 | WEIGHT: 199 LBS | SYSTOLIC BLOOD PRESSURE: 128 MMHG | DIASTOLIC BLOOD PRESSURE: 84 MMHG | HEART RATE: 98 BPM | TEMPERATURE: 99.2 F | HEIGHT: 61 IN

## 2019-01-23 DIAGNOSIS — Z01.419 WELL WOMAN EXAM: Primary | ICD-10-CM

## 2019-01-23 DIAGNOSIS — Z11.1 SCREENING-PULMONARY TB: ICD-10-CM

## 2019-01-23 LAB
MM INDURATION POC: 0 MM (ref 0–5)
PPD POC: NEGATIVE NEGATIVE

## 2019-01-23 RX ORDER — SPIRONOLACTONE 50 MG/1
TABLET, FILM COATED ORAL 2 TIMES DAILY
COMMUNITY
Start: 2018-12-27

## 2019-01-23 RX ORDER — DORZOLAMIDE HCL 20 MG/ML
SOLUTION/ DROPS OPHTHALMIC
COMMUNITY
Start: 2018-11-13 | End: 2021-07-29 | Stop reason: ALTCHOICE

## 2019-01-23 NOTE — PATIENT INSTRUCTIONS
Body Mass Index: Care Instructions Your Care Instructions Body mass index (BMI) can help you see if your weight is raising your risk for health problems. It uses a formula to compare how much you weigh with how tall you are. · A BMI lower than 18.5 is considered underweight. · A BMI between 18.5 and 24.9 is considered healthy. · A BMI between 25 and 29.9 is considered overweight. A BMI of 30 or higher is considered obese. If your BMI is in the normal range, it means that you have a lower risk for weight-related health problems. If your BMI is in the overweight or obese range, you may be at increased risk for weight-related health problems, such as high blood pressure, heart disease, stroke, arthritis or joint pain, and diabetes. If your BMI is in the underweight range, you may be at increased risk for health problems such as fatigue, lower protection (immunity) against illness, muscle loss, bone loss, hair loss, and hormone problems. BMI is just one measure of your risk for weight-related health problems. You may be at higher risk for health problems if you are not active, you eat an unhealthy diet, or you drink too much alcohol or use tobacco products. Follow-up care is a key part of your treatment and safety. Be sure to make and go to all appointments, and call your doctor if you are having problems. It's also a good idea to know your test results and keep a list of the medicines you take. How can you care for yourself at home? · Practice healthy eating habits. This includes eating plenty of fruits, vegetables, whole grains, lean protein, and low-fat dairy. · If your doctor recommends it, get more exercise. Walking is a good choice. Bit by bit, increase the amount you walk every day. Try for at least 30 minutes on most days of the week. · Do not smoke. Smoking can increase your risk for health problems.  If you need help quitting, talk to your doctor about stop-smoking programs and medicines. These can increase your chances of quitting for good. · Limit alcohol to 2 drinks a day for men and 1 drink a day for women. Too much alcohol can cause health problems. If you have a BMI higher than 25 · Your doctor may do other tests to check your risk for weight-related health problems. This may include measuring the distance around your waist. A waist measurement of more than 40 inches in men or 35 inches in women can increase the risk of weight-related health problems. · Talk with your doctor about steps you can take to stay healthy or improve your health. You may need to make lifestyle changes to lose weight and stay healthy, such as changing your diet and getting regular exercise. If you have a BMI lower than 18.5 · Your doctor may do other tests to check your risk for health problems. · Talk with your doctor about steps you can take to stay healthy or improve your health. You may need to make lifestyle changes to gain or maintain weight and stay healthy, such as getting more healthy foods in your diet and doing exercises to build muscle. Where can you learn more? Go to http://michelle-oleg.info/. Enter S176 in the search box to learn more about \"Body Mass Index: Care Instructions. \" Current as of: October 13, 2016 Content Version: 11.4 © 0029-0470 [employer] (formerly [employer]). Care instructions adapted under license by [employer] (which disclaims liability or warranty for this information). If you have questions about a medical condition or this instruction, always ask your healthcare professional. [place] [address] any warranty or liability for your use of this information. Tuberculin Skin Test: Care Instructions Your Care Instructions Tuberculosis (TB) is a bacterial infection that can damage the lungs or other parts of the body.  The TB skin test can tell if you have TB bacteria in your body. Many people are exposed to TB and test positive for TB bacteria in their bodies, but they don't get the disease. TB bacteria can stay in your body without making you sick. This is because your immune system can keep TB in check. Your doctor may want you to have a TB skin test if you have been in close contact with someone who has TB. Or you may need the test if you have symptoms that might be caused by TB, such as a cough that does not go away, a fever, or weight loss. You also may get the test if you are a health care worker. During the skin test, part of a TB bacterium is injected under your skin. The test will feel like a skin prick. If you have TB bacteria in your body, a firm red bump will form at the shot site within 2 days. If the test shows that you are infected with TB (positive), your doctor probably will order more tests. A TB-positive skin test can't tell when you became infected with TB. And it can't tell whether the infection can be passed to others. Follow-up care is a key part of your treatment and safety. Be sure to make and go to all appointments, and call your doctor if you are having problems. It's also a good idea to know your test results and keep a list of the medicines you take. How can you care for yourself at home? · Do not scratch the test site. Scratching it may cause redness or swelling. This could affect the test results. · To ease itching, put a cold washcloth on the site. Then pat the site dry. · Do not cover the test site with a bandage or other dressing. · Go back to your doctor's office or hospital to have the test read on the follow-up date. This must be done between 48 and 72 hours after you get the shot. When should you call for help? Watch closely for changes in your health, and be sure to contact your doctor if you have any problems. Where can you learn more? Go to http://michelle-oleg.info/. Enter [phone] [phone] in the search box to learn more about \"Tuberculin Skin Test: Care Instructions. \" Current as of: July 30, 2018 Content Version: 11.9 © 0099-9300 [employer]. Care instructions adapted under license by [employer] (which disclaims liability or warranty for this information). If you have questions about a medical condition or this instruction, always ask your healthcare professional. [place] [address] any warranty or liability for your use of this information.

## 2019-01-23 NOTE — PROGRESS NOTES
Chief Complaint Patient presents with  Employment Physical  
 
 
Health Maintenance Due Topic Date Due  
 FOOT EXAM Q1  05/12/2000  Pneumococcal 19-64 Medium Risk (1 of 1 - PPSV23) 05/12/2009  PAP AKA CERVICAL CYTOLOGY  05/12/2011  Influenza Age 5 to Adult  08/01/2018  HEMOGLOBIN A1C Q6M  10/02/2018 Health Maintenance reviewed 1. Have you been to the ER, urgent care clinic since your last visit? Hospitalized since your last visit? Yes When: 12/18 Where: urgent care Reason for visit: foreign body in ear 2. Have you seen or consulted any other health care providers outside of the [address] [place] [address] since your last visit? Include any pap smears or colon screening.  No

## 2019-01-23 NOTE — PROGRESS NOTES
Subjective:  
29 y.o. female for Well Woman Check. Her gyne and breast care is done elsewhere by her Ob-Gyne physician. Last menses around 12/22/18 Patient Active Problem List  
Diagnosis Code  Controlled diabetes mellitus type 2 with complications ([facility] X67.0  Mixed hyperlipidemia E78.2  
 Essential hypertension I10  Vitamin D deficiency E55.9  Severe obesity (BMI 35.0-39. 9) with comorbidity ([address] E66.01  
 Type 2 diabetes with nephropathy ([facility]) E11.21  
 Mild nonproliferative diabetic retinopathy associated with type 2 diabetes mellitus ([address] J04.0116 Patient Active Problem List  
 Diagnosis Date Noted  Mild nonproliferative diabetic retinopathy associated with type 2 diabetes mellitus ([address] 08/20/2018  Type 2 diabetes with nephropathy ([facility] 06/15/2018  Controlled diabetes mellitus type 2 with complications ([facility] 05/97/5269  Mixed hyperlipidemia 04/10/2018  Essential hypertension 04/10/2018  Vitamin D deficiency 04/10/2018  Severe obesity (BMI 35.0-39. 9) with comorbidity ([facility] 04/10/2018 Current Outpatient Medications Medication Sig Dispense Refill  amLODIPine (NORVASC) 10 mg tablet TAKE 1 TABLET BY MOUTH ONCE DAILY 30 Tab 5  
 metFORMIN (GLUCOPHAGE) 500 mg tablet Take  by mouth two (2) times daily (with meals).  pravastatin (PRAVACHOL) 20 mg tablet TAKE 1 TABLET BY MOUTH NIGHTLY 90 Tab 1  potassium chloride (KLOR-CON) 10 mEq tablet Take 1 Tab by mouth daily. 30 Tab 5  
 exenatide microspheres (BYDUREON) 2 mg/0.65 mL pnij 2 mg by SubCUTAneous route every seven (7) days.  Indications: type 2 diabetes mellitus 0.65 mL 3  
 insulin glargine (LANTUS,BASAGLAR) 100 unit/mL (3 mL) inpn 10 units nightly  Indications: type 2 diabetes mellitus (Patient taking differently: 12 units nightly  Indications: type 2 diabetes mellitus) 3 Pen 0  
 Insulin Needles, Disposable, (BD ULTRA-FINE MICRO PEN NEEDLE) 32 gauge x \" ndle 1 Box by Does Not Apply route two (2) times a day. 100 Pen Needle 0  Blood-Glucose Meter monitoring kit Use once in the morning for fasting blood glucose and once two hours after a meal 1 Kit 0  
 glucose blood VI test strips (BLOOD GLUCOSE TEST) strip Use once in the morning for fasting blood glucose and once two hours after a meal 100 Strip 0  
 lancets 32 gauge misc 100 Each by Does Not Apply route two (2) times daily (with meals). 100 Lancet 0  
 spironolactone (ALDACTONE) 50 mg tablet  dorzolamide (TRUSOPT) 2 % ophthalmic solution No Known Allergies No past medical history on file. No past surgical history on file. Family History Problem Relation Age of Onset  Hypertension Mother  Hypertension Father  Hypertension Paternal Grandmother  Hypertension Paternal Grandfather Social History Tobacco Use  Smoking status: Former Smoker Start date: 2008 Last attempt to quit: 2013 Years since quittin.6  Smokeless tobacco: Never Used Substance Use Topics  Alcohol use: Yes Alcohol/week: 1.2 - 1.8 oz Types: 1 - 2 Glasses of wine, 1 Shots of liquor per week  
  
 
labs ordered ROS: Feeling generally well. No TIA's or unusual headaches, no dysphagia. No prolonged cough. No dyspnea or chest pain on exertion. No abdominal pain, change in bowel habits, black or bloody stools. No urinary tract symptoms. No new or unusual musculoskeletal symptoms. Specific concerns today: breast tenderness when she is menstruating. 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 Objective: The patient appears well, alert, oriented x 3, in no distress. Visit Vitals /84 (BP 1 Location: Left arm, BP Patient Position: Sitting) Pulse 98 Temp 99.2 °F (37.3 °C) (Oral) Resp 16 Ht 5' 1\" (1.549 m) Wt 199 lb (90.3 kg) BMI 37.60 kg/m² ENT normal.  Neck supple. No adenopathy or thyromegaly. GIORGIO. Lungs are clear, good air entry, no wheezes, rhonchi or rales. S1 and S2 normal, no murmurs, regular rate and rhythm. Abdomen soft without tenderness, guarding, mass or organomegaly. Extremities show no edema, normal peripheral pulses. Neurological is normal, no focal findings. Breast and Pelvic exams are deferred. Assessment/Plan:  
Well Woman 
lose weight, increase physical activity, continue present plan, go for fasting labs. ICD-10-CM ICD-9-CM 1. Well woman exam F59.030 W20.20 METABOLIC PANEL, COMPREHENSIVE  
   LIPID PANEL  
   CBC WITH AUTOMATED DIFF  
   TSH 3RD GENERATION  
   T4, FREE  
   HEMOGLOBIN A1C WITH EAG  
   VITAMIN D, 25 HYDROXY  
   MUMPS AB, IGG  
   RUBELLA AB, IGG  
   RUBEOLA AB, IGG  
   VZV AB, IGG  
   HEP B SURFACE AB 2. Screening-pulmonary TB Z11.1 V74.1 AMB POC TUBERCULOSIS, INTRADERMAL (SKIN TEST) 3. BMI 37.0-37.9, adult Z68.37 V85.37 Discussed the patient's BMI with her. The BMI follow up plan is as follows:  
 
dietary management education, guidance, and counseling 
encourage exercise 
monitor weight 
prescribed dietary intake An After Visit Summary was printed and given to the patient. Reassurance given regarding breast tenderness during menses. Instructed patient to follow up with GYN for her routine pap and breast exam.  
Will need labs completed prior to form completion.   
 
CHRIST Modi

## 2019-01-23 NOTE — PROGRESS NOTES
PPD Placement note Janice Tubbs, 29 y.o. female is here today for placement of PPD test 
Reason for PPD test: work Pt taken PPD test before: yes Verified in allergy area and with patient that they are not allergic to the products PPD is made of (Phenol or Tween). Yes Is patient taking any oral or IV steroid medication now or have they taken it in the last month? no 
Has the patient ever received the BCG vaccine?: no 
Has the patient been in recent contact with anyone known or suspected of having active TB disease?: no 
     O: Alert and oriented in NAD. P:  PPD placed on 1/23/2019. Patient advised to return for reading within 48-72 hours.

## 2019-05-31 LAB — HBA1C MFR BLD HPLC: 7.7 %

## 2020-01-27 LAB — HBA1C MFR BLD HPLC: 7.1 %

## 2020-06-16 DIAGNOSIS — E11.8 CONTROLLED DIABETES MELLITUS TYPE 2 WITH COMPLICATIONS, UNSPECIFIED WHETHER LONG TERM INSULIN USE (HCC): ICD-10-CM

## 2020-06-16 DIAGNOSIS — I10 ESSENTIAL HYPERTENSION: ICD-10-CM

## 2020-06-16 DIAGNOSIS — E78.2 MIXED HYPERLIPIDEMIA: Primary | ICD-10-CM

## 2020-06-20 LAB
A-G RATIO,AGRAT: 1.4 RATIO (ref 1.1–2.6)
ABSOLUTE LYMPHOCYTE COUNT, 10803: 3.1 K/UL (ref 1–4.8)
ALBUMIN SERPL-MCNC: 4.6 G/DL (ref 3.5–5)
ALP SERPL-CCNC: 86 U/L (ref 25–115)
ALT SERPL-CCNC: 29 U/L (ref 5–40)
ANION GAP SERPL CALC-SCNC: 16.2 MMOL/L
AST SERPL W P-5'-P-CCNC: 14 U/L (ref 10–37)
BASOPHILS # BLD: 0 K/UL (ref 0–0.2)
BASOPHILS NFR BLD: 1 % (ref 0–2)
BILIRUB SERPL-MCNC: 0.2 MG/DL (ref 0.2–1.2)
BUN SERPL-MCNC: 6 MG/DL (ref 6–22)
CALCIUM SERPL-MCNC: 10 MG/DL (ref 8.4–10.5)
CHLORIDE SERPL-SCNC: 97 MMOL/L (ref 98–110)
CHOLEST SERPL-MCNC: 215 MG/DL (ref 110–200)
CO2 SERPL-SCNC: 25 MMOL/L (ref 20–32)
CREAT SERPL-MCNC: 0.6 MG/DL (ref 0.5–1.2)
CREATININE, URINE: 163 MG/DL
CREATININE, URINE: 174 MG/DL
EOSINOPHIL # BLD: 0.1 K/UL (ref 0–0.5)
EOSINOPHIL NFR BLD: 1 % (ref 0–6)
ERYTHROCYTE [DISTWIDTH] IN BLOOD BY AUTOMATED COUNT: 14.3 % (ref 10–15.5)
GFRAA, 66117: >60
GFRNA, 66118: >60
GLOBULIN,GLOB: 3.4 G/DL (ref 2–4)
GLUCOSE SERPL-MCNC: 216 MG/DL (ref 70–99)
GRANULOCYTES,GRANS: 56 % (ref 40–75)
HCT VFR BLD AUTO: 37.1 % (ref 35.1–46.5)
HDLC SERPL-MCNC: 30 MG/DL
HDLC SERPL-MCNC: 7.2 MG/DL (ref 0–5)
HGB BLD-MCNC: 12.3 G/DL (ref 11.7–15.5)
LDL/HDL RATIO,LDHD: 4.8
LDLC SERPL CALC-MCNC: 142 MG/DL (ref 50–99)
LYMPHOCYTES, LYMLT: 36 % (ref 20–45)
MCH RBC QN AUTO: 28 PG (ref 26–34)
MCHC RBC AUTO-ENTMCNC: 33 G/DL (ref 31–36)
MCV RBC AUTO: 85 FL (ref 81–99)
MICROALB/CREAT RATIO, 140286: 27 (ref 0–30)
MICROALBUMIN,URINE RANDOM 140054: 44 MG/L (ref 0.1–17)
MONOCYTES # BLD: 0.5 K/UL (ref 0.1–1)
MONOCYTES NFR BLD: 6 % (ref 3–12)
NEUTROPHILS # BLD AUTO: 4.8 K/UL (ref 1.8–7.7)
NON-HDL CHOLESTEROL, 011976: 185 MG/DL
PLATELET # BLD AUTO: 412 K/UL (ref 140–440)
PMV BLD AUTO: 10.5 FL (ref 9–13)
POTASSIUM SERPL-SCNC: 3.9 MMOL/L (ref 3.5–5.5)
PROT SERPL-MCNC: 8 G/DL (ref 6.4–8.3)
PROTEIN TOTAL, URINE, 013664: 31 MG/DL
PROTEIN/CREATININE RATIO: 0.18
RBC # BLD AUTO: 4.38 M/UL (ref 3.8–5.2)
SODIUM SERPL-SCNC: 138 MMOL/L (ref 133–145)
TRIGL SERPL-MCNC: 215 MG/DL (ref 40–149)
TSH SERPL DL<=0.005 MIU/L-ACNC: 2.1 MCU/ML (ref 0.27–4.2)
VLDLC SERPL CALC-MCNC: 43 MG/DL (ref 8–30)
WBC # BLD AUTO: 8.5 K/UL (ref 4–11)

## 2020-06-24 ENCOUNTER — VIRTUAL VISIT (OUTPATIENT)
Dept: FAMILY MEDICINE CLINIC | Age: 30
End: 2020-06-24

## 2020-06-24 DIAGNOSIS — I10 ESSENTIAL HYPERTENSION: Primary | ICD-10-CM

## 2020-06-24 DIAGNOSIS — Z71.2 ENCOUNTER TO DISCUSS TEST RESULTS: ICD-10-CM

## 2020-06-24 DIAGNOSIS — E78.89 LIPIDS ABNORMAL: ICD-10-CM

## 2020-06-24 DIAGNOSIS — E78.2 MIXED HYPERLIPIDEMIA: ICD-10-CM

## 2020-06-24 RX ORDER — AMLODIPINE BESYLATE 10 MG/1
TABLET ORAL
Qty: 30 TAB | Refills: 12 | Status: SHIPPED | OUTPATIENT
Start: 2020-06-24 | End: 2021-07-29 | Stop reason: SDUPTHER

## 2020-06-24 RX ORDER — PRAVASTATIN SODIUM 20 MG/1
TABLET ORAL
Qty: 90 TAB | Refills: 1 | Status: SHIPPED | OUTPATIENT
Start: 2020-06-24 | End: 2021-07-29 | Stop reason: ALTCHOICE

## 2020-06-24 NOTE — PROGRESS NOTES
Lucia Mccarty is a 27 y.o. female who was seen by synchronous (real-time) audio-video technology on 6/24/2020. Consent: Lucia Mccarty, who was seen by synchronous (real-time) audio-video technology, and/or her healthcare decision maker, is aware that this patient-initiated, Telehealth encounter on 6/24/2020 is a billable service, with coverage as determined by her insurance carrier. She is aware that she may receive a bill and has provided verbal consent to proceed: Yes. Assessment & Plan:   Diagnoses and all orders for this visit:    1. Essential hypertension  -     amLODIPine (NORVASC) 10 mg tablet; TAKE 1 TABLET BY MOUTH ONCE DAILY  -     METABOLIC PANEL, COMPREHENSIVE; Future  -     LIPID PANEL; Future    2. Mixed hyperlipidemia  -     pravastatin (PRAVACHOL) 20 mg tablet; TAKE 1 TABLET BY MOUTH NIGHTLY  -     METABOLIC PANEL, COMPREHENSIVE; Future  -     LIPID PANEL; Future    3. Lipids abnormal  -     LIPID PANEL; Future    4. Encounter to discuss test results      Labs reviewed. Patient or repeat them on one month fasting. Follow with endocrinology. I spent at least 25 minutes on this visit with this established patient. Subjective:   Lucia Mccarty is a 27 y.o. female who was seen for Hypertension and Results (discuss lab results)  Endocrinology -Letty Mckenzie  And her last A1C was 7.1  She reports she is not exercising. She was not fasting when she took her last labs. She denies chest pain or shortness of breath. Taking her blood pressure medications as prescribed. Prior to Admission medications    Medication Sig Start Date End Date Taking? Authorizing Provider   amLODIPine (NORVASC) 10 mg tablet TAKE 1 TABLET BY MOUTH ONCE DAILY 6/24/20  Yes Unique CORNEJO NP   pravastatin (PRAVACHOL) 20 mg tablet TAKE 1 TABLET BY MOUTH NIGHTLY 6/24/20  Yes Unique CORNEJO NP   spironolactone (ALDACTONE) 50 mg tablet two (2) times a day.  12/27/18  Yes Provider, Historical dorzolamide (TRUSOPT) 2 % ophthalmic solution  11/13/18  Yes Provider, Historical   metFORMIN (GLUCOPHAGE) 500 mg tablet Take  by mouth two (2) times daily (with meals). Yes Provider, Historical   potassium chloride (KLOR-CON) 10 mEq tablet Take 1 Tab by mouth daily. 6/15/18  Yes Manual Read B, NP   exenatide microspheres (BYDUREON) 2 mg/0.65 mL pnij 2 mg by SubCUTAneous route every seven (7) days. Indications: type 2 diabetes mellitus 4/13/18  Yes Manual Read B, NP   insulin glargine (LANTUS,BASAGLAR) 100 unit/mL (3 mL) inpn 10 units nightly  Indications: type 2 diabetes mellitus  Patient taking differently: 12 units nightly  Indications: type 2 diabetes mellitus 4/10/18  Yes Elbert Bernal, NP   Insulin Needles, Disposable, (BD ULTRA-FINE MICRO PEN NEEDLE) 32 gauge x 1/4\" ndle 1 Box by Does Not Apply route two (2) times a day. 4/10/18  Yes Tenisha Diallo NP   Blood-Glucose Meter monitoring kit Use once in the morning for fasting blood glucose and once two hours after a meal 4/10/18  Yes Elbert Bernal NP   glucose blood VI test strips (BLOOD GLUCOSE TEST) strip Use once in the morning for fasting blood glucose and once two hours after a meal 4/10/18  Yes Manual Cory CORNEJO, NP   lancets 32 gauge misc 100 Each by Does Not Apply route two (2) times daily (with meals). 4/10/18  Yes Tenisha Diallo NP   amLODIPine (NORVASC) 10 mg tablet TAKE 1 TABLET BY MOUTH ONCE DAILY 7/5/19 6/24/20  Manual Read CLEMENTE, NP   pravastatin (PRAVACHOL) 20 mg tablet TAKE 1 TABLET BY MOUTH NIGHTLY 6/15/18 6/24/20  Zuhair CORNEJO, NP     No Known Allergies    Patient Active Problem List   Diagnosis Code    Controlled diabetes mellitus type 2 with complications ([facility] Q44.7    Mixed hyperlipidemia E78.2    Essential hypertension I10    Vitamin D deficiency E55.9    Severe obesity (BMI 35.0-39. 9) with comorbidity ([facility] [facility] E66.01    Type 2 diabetes with nephropathy (HCC) E11.21    Mild nonproliferative diabetic retinopathy associated with type 2 diabetes mellitus ([place] [address] E11.3299     Patient Active Problem List    Diagnosis Date Noted    Mild nonproliferative diabetic retinopathy associated with type 2 diabetes mellitus ([facility] 08/20/2018    Type 2 diabetes with nephropathy ([facility] 06/15/2018    Controlled diabetes mellitus type 2 with complications ([place] [address] 11/89/5701    Mixed hyperlipidemia 04/10/2018    Essential hypertension 04/10/2018    Vitamin D deficiency 04/10/2018    Severe obesity (BMI 35.0-39. 9) with comorbidity ([facility] 04/10/2018     Current Outpatient Medications   Medication Sig Dispense Refill    amLODIPine (NORVASC) 10 mg tablet TAKE 1 TABLET BY MOUTH ONCE DAILY 30 Tab 12    pravastatin (PRAVACHOL) 20 mg tablet TAKE 1 TABLET BY MOUTH NIGHTLY 90 Tab 1    spironolactone (ALDACTONE) 50 mg tablet two (2) times a day.  dorzolamide (TRUSOPT) 2 % ophthalmic solution       metFORMIN (GLUCOPHAGE) 500 mg tablet Take  by mouth two (2) times daily (with meals).  potassium chloride (KLOR-CON) 10 mEq tablet Take 1 Tab by mouth daily. 30 Tab 5    exenatide microspheres (BYDUREON) 2 mg/0.65 mL pnij 2 mg by SubCUTAneous route every seven (7) days. Indications: type 2 diabetes mellitus 0.65 mL 3    insulin glargine (LANTUS,BASAGLAR) 100 unit/mL (3 mL) inpn 10 units nightly  Indications: type 2 diabetes mellitus (Patient taking differently: 12 units nightly  Indications: type 2 diabetes mellitus) 3 Pen 0    Insulin Needles, Disposable, (BD ULTRA-FINE MICRO PEN NEEDLE) 32 gauge x 1/4\" ndle 1 Box by Does Not Apply route two (2) times a day.  100 Pen Needle 0    Blood-Glucose Meter monitoring kit Use once in the morning for fasting blood glucose and once two hours after a meal 1 Kit 0    glucose blood VI test strips (BLOOD GLUCOSE TEST) strip Use once in the morning for fasting blood glucose and once two hours after a meal 100 Strip 0    lancets 32 gauge misc 100 Each by Does Not Apply route two (2) times daily (with meals). 100 Lancet 0     No Known Allergies  No past medical history on file. No past surgical history on file. Family History   Problem Relation Age of Onset    Hypertension Mother     Hypertension Father     Hypertension Paternal Grandmother     Hypertension Paternal Grandfather      Social History     Tobacco Use    Smoking status: Former Smoker     Start date: 2008     Last attempt to quit: 2013     Years since quittin.0    Smokeless tobacco: Never Used   Substance Use Topics    Alcohol use: Yes     Alcohol/week: 2.0 - 3.0 standard drinks     Types: 1 - 2 Glasses of wine, 1 Shots of liquor per week       Review of Systems   Constitutional: Negative for chills and fever. HENT: Negative for congestion and sore throat. Eyes: Negative for blurred vision. Respiratory: Negative for cough and shortness of breath. Cardiovascular: Negative for chest pain and leg swelling. Gastrointestinal: Negative for abdominal pain, nausea and vomiting. Musculoskeletal: Negative for falls. Neurological: Negative for dizziness. Psychiatric/Behavioral: Negative for depression and suicidal ideas. Objective:   Vital Signs: (As obtained by patient/caregiver at home)  There were no vitals taken for this visit.    3 most recent PHQ Screens 2020   Little interest or pleasure in doing things Not at all   Feeling down, depressed, irritable, or hopeless Not at all   Total Score PHQ 2 0       [INSTRUCTIONS:  \"[x]\" Indicates a positive item  \"[]\" Indicates a negative item  -- DELETE ALL ITEMS NOT EXAMINED]    Constitutional: [x] Appears well-developed and well-nourished [x] No apparent distress      [] Abnormal -     Mental status: [x] Alert and awake  [x] Oriented to person/place/time [x] Able to follow commands    [] Abnormal -     Eyes:   EOM    [x]  Normal    [] Abnormal -   Sclera  [x]  Normal    [] Abnormal -          Discharge [x]  None visible   [] Abnormal -     HENT: [x] Normocephalic, atraumatic  [] Abnormal -   [x] Mouth/Throat: Mucous membranes are moist    External Ears [x] Normal  [] Abnormal -    Neck: [x] No visualized mass [] Abnormal -     Pulmonary/Chest: [x] Respiratory effort normal   [x] No visualized signs of difficulty breathing or respiratory distress        [] Abnormal -      Musculoskeletal:   [] Normal gait with no signs of ataxia         [x] Normal range of motion of neck        [] Abnormal -     Neurological:        [x] No Facial Asymmetry (Cranial nerve 7 motor function) (limited exam due to video visit)          [x] No gaze palsy        [] Abnormal -          Skin:        [x] No significant exanthematous lesions or discoloration noted on facial skin         [] Abnormal -            Psychiatric:       [x] Normal Affect [] Abnormal -        [x] No Hallucinations  We discussed the expected course, resolution and complications of the diagnosis(es) in detail. Medication risks, benefits, costs, interactions, and alternatives were discussed as indicated. I advised her to contact the office if her condition worsens, changes or fails to improve as anticipated. She expressed understanding with the diagnosis(es) and plan. Yuliana Rebolledo is a 27 y.o. female who was evaluated by a video visit encounter for concerns as above. Patient identification was verified prior to start of the visit. A caregiver was present when appropriate. Due to this being a TeleHealth encounter (During UYWIZ-04 public health emergency), evaluation of the following organ systems was limited: Vitals/Constitutional/EENT/Resp/CV/GI//MS/Neuro/Skin/Heme-Lymph-Imm.   Pursuant to the emergency declaration under the [address] [place] [address] and the [employer] Act, this Virtual  Visit was conducted, with patient's (and/or legal guardian's) consent, to reduce the patient's risk of exposure to COVID-19 and provide necessary medical care. Services were provided through a video synchronous discussion virtually to substitute for in-person clinic visit. Patient was located at their individual homes. Provider was located in the office.      Bib Taylor NP

## 2020-06-24 NOTE — PROGRESS NOTES
Marni Villalobos presents today for   Chief Complaint   Patient presents with    Hypertension    Results     discuss lab results       Marni Villalobos preferred language for health care discussion is english/other. Is someone accompanying this pt? no    Is the patient using any DME equipment during [address]? no    Depression Screening:  3 most recent PHQ Screens 6/24/2020   Little interest or pleasure in doing things Not at all   Feeling down, depressed, irritable, or hopeless Not at all   Total Score PHQ 2 0       Learning Assessment:  Learning Assessment 6/24/2020   PRIMARY LEARNER Patient   HIGHEST LEVEL OF EDUCATION - PRIMARY LEARNER  4 YEARS OF COLLEGE   BARRIERS PRIMARY LEARNER NONE   CO-LEARNER CAREGIVER No   PRIMARY LANGUAGE ENGLISH    NEED No   LEARNER PREFERENCE PRIMARY DEMONSTRATION   ANSWERED BY michelle   RELATIONSHIP SELF       Abuse Screening:  Abuse Screening Questionnaire 6/24/2020   Do you ever feel afraid of your partner? N   Are you in a relationship with someone who physically or mentally threatens you? N   Is it safe for you to go home? Y       Generalized Anxiety  No flowsheet data found. Health Maintenance Due   Topic Date Due    Pneumococcal 0-64 years (1 of 1 - PPSV23) 05/12/1996    PAP AKA CERVICAL CYTOLOGY  05/12/2011    Foot Exam Q1  08/10/2019   . Health Maintenance reviewed and discussed and ordered per Provider. Coordination of Care:  1. Have you been to the ER, urgent care clinic since your last visit? Hospitalized since your last visit? no    2. Have you seen or consulted any other health care providers outside of the [address] [place] [address] since your last visit? Include any pap smears or colon screening. no      Advance Directive:  1. Do you have an advance directive in place?  Patient Reply:no

## 2020-09-29 ENCOUNTER — OFFICE VISIT (OUTPATIENT)
Dept: FAMILY MEDICINE CLINIC | Age: 30
End: 2020-09-29

## 2020-09-29 VITALS
DIASTOLIC BLOOD PRESSURE: 88 MMHG | BODY MASS INDEX: 38.63 KG/M2 | OXYGEN SATURATION: 99 % | HEIGHT: 61 IN | RESPIRATION RATE: 16 BRPM | WEIGHT: 204.6 LBS | HEART RATE: 114 BPM | TEMPERATURE: 98.4 F | SYSTOLIC BLOOD PRESSURE: 132 MMHG

## 2020-09-29 DIAGNOSIS — Z23 ENCOUNTER FOR IMMUNIZATION: ICD-10-CM

## 2020-09-29 DIAGNOSIS — I10 ESSENTIAL HYPERTENSION: ICD-10-CM

## 2020-09-29 DIAGNOSIS — Z02.89 ENCOUNTER FOR COMPLETION OF FORM WITH PATIENT: ICD-10-CM

## 2020-09-29 DIAGNOSIS — Z00.00 WELL WOMAN EXAM (NO GYNECOLOGICAL EXAM): Primary | ICD-10-CM

## 2020-09-29 DIAGNOSIS — E78.2 MIXED HYPERLIPIDEMIA: ICD-10-CM

## 2020-09-29 DIAGNOSIS — E11.41 TYPE 2 DIABETES MELLITUS WITH DIABETIC MONONEUROPATHY, WITHOUT LONG-TERM CURRENT USE OF INSULIN (HCC): ICD-10-CM

## 2020-09-29 DIAGNOSIS — Z11.1 SCREENING-PULMONARY TB: ICD-10-CM

## 2020-09-29 PROCEDURE — 90715 TDAP VACCINE 7 YRS/> IM: CPT | Performed by: NURSE PRACTITIONER

## 2020-09-29 PROCEDURE — 99395 PREV VISIT EST AGE 18-39: CPT | Performed by: NURSE PRACTITIONER

## 2020-09-29 PROCEDURE — 3051F HG A1C>EQUAL 7.0%<8.0%: CPT | Performed by: NURSE PRACTITIONER

## 2020-09-29 PROCEDURE — 86580 TB INTRADERMAL TEST: CPT | Performed by: NURSE PRACTITIONER

## 2020-09-29 NOTE — PROGRESS NOTES
Subjective:   27 y.o. female for Well Woman Check. Her gyne and breast care is done elsewhere by her Ob-Gyne physician. Patient Active Problem List   Diagnosis Code    Controlled diabetes mellitus type 2 with complications ([facility] U50.1    Mixed hyperlipidemia E78.2    Essential hypertension I10    Vitamin D deficiency E55.9    Severe obesity (BMI 35.0-39. 9) with comorbidity ([address] E66.01    Type 2 diabetes with nephropathy ([address] E11.21    Mild nonproliferative diabetic retinopathy associated with type 2 diabetes mellitus ([address] E11.3299     Patient Active Problem List    Diagnosis Date Noted    Mild nonproliferative diabetic retinopathy associated with type 2 diabetes mellitus ([address] 08/20/2018    Type 2 diabetes with nephropathy ([address] 06/15/2018    Controlled diabetes mellitus type 2 with complications ([address] 22/02/0310    Mixed hyperlipidemia 04/10/2018    Essential hypertension 04/10/2018    Vitamin D deficiency 04/10/2018    Severe obesity (BMI 35.0-39. 9) with comorbidity ([address] 04/10/2018     Current Outpatient Medications   Medication Sig Dispense Refill    amLODIPine (NORVASC) 10 mg tablet TAKE 1 TABLET BY MOUTH ONCE DAILY 30 Tab 12    pravastatin (PRAVACHOL) 20 mg tablet TAKE 1 TABLET BY MOUTH NIGHTLY 90 Tab 1    spironolactone (ALDACTONE) 50 mg tablet two (2) times a day.  dorzolamide (TRUSOPT) 2 % ophthalmic solution       metFORMIN (GLUCOPHAGE) 500 mg tablet Take  by mouth two (2) times daily (with meals).  potassium chloride (KLOR-CON) 10 mEq tablet Take 1 Tab by mouth daily. 30 Tab 5    exenatide microspheres (BYDUREON) 2 mg/0.65 mL pnij 2 mg by SubCUTAneous route every seven (7) days.  Indications: type 2 diabetes mellitus 0.65 mL 3    insulin glargine (LANTUS,BASAGLAR) 100 unit/mL (3 mL) inpn 10 units nightly  Indications: type 2 diabetes mellitus (Patient taking differently: 12 units nightly  Indications: type 2 diabetes mellitus) 3 Pen 0    Insulin Needles, Disposable, (BD ULTRA-FINE MICRO PEN NEEDLE) 32 gauge x 1/4\" ndle 1 Box by Does Not Apply route two (2) times a day. 100 Pen Needle 0    Blood-Glucose Meter monitoring kit Use once in the morning for fasting blood glucose and once two hours after a meal 1 Kit 0    glucose blood VI test strips (BLOOD GLUCOSE TEST) strip Use once in the morning for fasting blood glucose and once two hours after a meal 100 Strip 0    lancets 32 gauge misc 100 Each by Does Not Apply route two (2) times daily (with meals). 100 Lancet 0     No Known Allergies  History reviewed. No pertinent past medical history. History reviewed. No pertinent surgical history. Family History   Problem Relation Age of Onset    Hypertension Mother     Hypertension Father     Hypertension Paternal Grandmother     Hypertension Paternal Grandfather      Social History     Tobacco Use    Smoking status: Former Smoker     Start date: 2008     Last attempt to quit: 2013     Years since quittin.3    Smokeless tobacco: Never Used   Substance Use Topics    Alcohol use: Yes     Alcohol/week: 2.0 - 3.0 standard drinks     Types: 1 - 2 Glasses of wine, 1 Shots of liquor per week             ROS: Feeling generally well. No TIA's or unusual headaches, no dysphagia. No prolonged cough. No dyspnea or chest pain on exertion. No abdominal pain, change in bowel habits, black or bloody stools. No urinary tract symptoms. No new or unusual musculoskeletal symptoms. No new tingling or burning sensation in feet or extremities. Specific concerns today: None - Hear for form completion and physical for her job. Objective: The patient appears well, alert, oriented x 3, in no distress. Visit Vitals  /88 (BP 1 Location: Right arm, BP Patient Position: Sitting)   Pulse (!) 114   Temp 98.4 °F (36.9 °C) (Oral)   Resp 16   Ht 5' 1\" (1.549 m)   Wt 204 lb 9.6 oz (92.8 kg)   SpO2 99%   BMI 38.66 kg/m²     ENT normal.  Neck supple. No adenopathy or thyromegaly. GIORGIO. Lungs are clear, good air entry, no wheezes, rhonchi or rales. S1 and S2 normal, no murmurs, regular rate and rhythm. Abdomen soft without tenderness, guarding, mass or organomegaly. Extremities show no edema, normal peripheral pulses. Neurological is normal, no focal findings. Breast and Pelvic exams are deferred. Assessment/Plan:   Well Woman  lose weight, increase physical activity, have labs drawn prior to ROV  Encouraged to schedule Pap and eye exam  Discussed pneumococcal vaccine. ICD-10-CM ICD-9-CM    1. Well woman exam (no gynecological exam)  Z00.00 V70.0    2. Type 2 diabetes mellitus with diabetic mononeuropathy, without long-term current use of insulin (HCC)  E11.41 250.60 HM DIABETES FOOT EXAM     355.9    3. Essential hypertension  I10 401.9    4. Mixed hyperlipidemia  E78.2 272.2    5. Screening-pulmonary TB  Z11.1 V74.1 AMB POC TUBERCULOSIS, INTRADERMAL (SKIN TEST)   6. Encounter for immunization  Z23 V03.89 TETANUS, DIPHTHERIA TOXOIDS AND ACELLULAR PERTUSSIS VACCINE (TDAP), IN INDIVIDS. >=7, IM   7.  Encounter for completion of form with patient  Z02.89 V68.89      Alexandrea Marshall DNP, FNP-C

## 2020-10-01 LAB
MM INDURATION POC: 0 MM (ref 0–5)
PPD POC: NEGATIVE NEGATIVE

## 2021-07-29 ENCOUNTER — VIRTUAL VISIT (OUTPATIENT)
Dept: FAMILY MEDICINE CLINIC | Age: 31
End: 2021-07-29

## 2021-07-29 DIAGNOSIS — I10 ESSENTIAL HYPERTENSION: ICD-10-CM

## 2021-07-29 DIAGNOSIS — Z11.59 ENCOUNTER FOR HEPATITIS C SCREENING TEST FOR LOW RISK PATIENT: ICD-10-CM

## 2021-07-29 DIAGNOSIS — E78.2 MIXED HYPERLIPIDEMIA: ICD-10-CM

## 2021-07-29 DIAGNOSIS — E11.41 TYPE 2 DIABETES MELLITUS WITH DIABETIC MONONEUROPATHY, WITHOUT LONG-TERM CURRENT USE OF INSULIN (HCC): Primary | ICD-10-CM

## 2021-07-29 PROCEDURE — 99214 OFFICE O/P EST MOD 30 MIN: CPT | Performed by: NURSE PRACTITIONER

## 2021-07-29 RX ORDER — AMLODIPINE BESYLATE 10 MG/1
TABLET ORAL
Qty: 30 TABLET | Refills: 0 | Status: SHIPPED | OUTPATIENT
Start: 2021-07-29

## 2021-07-29 RX ORDER — ROSUVASTATIN CALCIUM 40 MG/1
40 TABLET, COATED ORAL DAILY
COMMUNITY
Start: 2021-07-16

## 2021-07-29 RX ORDER — FLUOXETINE HYDROCHLORIDE 40 MG/1
40 CAPSULE ORAL DAILY
COMMUNITY
Start: 2021-07-27

## 2021-07-29 NOTE — PROGRESS NOTES
Mao Reed is a 32 y.o. female who was seen by synchronous (real-time) audio-video technology on 7/29/2021 for Hypertension (BP  138/86)  reports her blood pressure is usually in the 120's and low 80's and her blood pressure is going up since she has been out of her norvasc. She has been out for a little over a week. Endocrinology did complete some of her labs but they forgot to order some. She has not had an A1C. She reports she had her labs at [facility] and endocrinology did put her on rosuvastatin for her cholesterol. She is on Prozac for anxiety and she is getting counseling through [facility]. Assessment & Plan:   Diagnoses and all orders for this visit:    1. Type 2 diabetes mellitus with diabetic mononeuropathy, without long-term current use of insulin (HCC)  -     METABOLIC PANEL, COMPREHENSIVE; Future  -     HEMOGLOBIN A1C WITH EAG; Future  -     MICROALBUMIN, UR, RAND W/ MICROALB/CREAT RATIO; Future    2. Essential hypertension  -     amLODIPine (NORVASC) 10 mg tablet; TAKE 1 TABLET BY MOUTH ONCE DAILY    3. Mixed hyperlipidemia  -     METABOLIC PANEL, COMPREHENSIVE; Future    4. Encounter for hepatitis C screening test for low risk patient  -     HEPATITIS C AB; Future      Lipid, cholesterol, TSH, AST, ALT, vitamin D, and vitamin B12 labs reviewed from [facility]. She is to continue with the prescription vitamin D and rosuvastatin endocrinology placed her on. She is to return to have her other labs completed. Subjective:       Prior to Admission medications    Medication Sig Start Date End Date Taking? Authorizing Provider   FLUoxetine (PROzac) 40 mg capsule Take 40 mg by mouth daily. 7/27/21  Yes Provider, Historical   rosuvastatin (CRESTOR) 40 mg tablet Take 40 mg by mouth daily.  7/16/21  Yes Provider, Historical   amLODIPine (NORVASC) 10 mg tablet TAKE 1 TABLET BY MOUTH ONCE DAILY 6/24/20  Yes Vamsi CORNEJO NP   spironolactone (ALDACTONE) 50 mg tablet two (2) times a day. 12/27/18  Yes Provider, Historical   metFORMIN (GLUCOPHAGE) 500 mg tablet Take  by mouth two (2) times daily (with meals). Yes Provider, Historical   potassium chloride (KLOR-CON) 10 mEq tablet Take 1 Tab by mouth daily. 6/15/18  Yes Briseyda CORNEJO NP   exenatide microspheres (BYDUREON) 2 mg/0.65 mL pnij 2 mg by SubCUTAneous route every seven (7) days. Indications: type 2 diabetes mellitus 4/13/18  Yes Briseyda CORNEJO NP   insulin glargine (LANTUS,BASAGLAR) 100 unit/mL (3 mL) inpn 10 units nightly  Indications: type 2 diabetes mellitus  Patient taking differently: 12 units nightly  Indications: type 2 diabetes mellitus 4/10/18  Yes Elbert Haney NP   Insulin Needles, Disposable, (BD ULTRA-FINE MICRO PEN NEEDLE) 32 gauge x 1/4\" ndle 1 Box by Does Not Apply route two (2) times a day. 4/10/18  Yes Khadijah Bal NP   Blood-Glucose Meter monitoring kit Use once in the morning for fasting blood glucose and once two hours after a meal 4/10/18  Yes Elbert Haney NP   glucose blood VI test strips (BLOOD GLUCOSE TEST) strip Use once in the morning for fasting blood glucose and once two hours after a meal 4/10/18  Yes Briseyda CORNEJO NP   lancets 32 gauge misc 100 Each by Does Not Apply route two (2) times daily (with meals). 4/10/18  Yes Khadijah Bal NP   pravastatin (PRAVACHOL) 20 mg tablet TAKE 1 TABLET BY MOUTH NIGHTLY  Patient not taking: Reported on 7/29/2021 6/24/20   Briseyda CORNEJO NP   dorzolamide (TRUSOPT) 2 % ophthalmic solution  11/13/18   Provider, Historical     Patient Active Problem List   Diagnosis Code    Controlled diabetes mellitus type 2 with complications ([facility] D92.4    Mixed hyperlipidemia E78.2    Essential hypertension I10    Vitamin D deficiency E55.9    Severe obesity (BMI 35.0-39. 9) with comorbidity ([facility] E66.01    Type 2 diabetes with nephropathy (HCC) E11.21    Mild nonproliferative diabetic retinopathy associated with type 2 diabetes mellitus [facility]) E11.3299     Patient Active Problem List    Diagnosis Date Noted    Mild nonproliferative diabetic retinopathy associated with type 2 diabetes mellitus ([facility] 08/20/2018    Type 2 diabetes with nephropathy ([facility] 06/15/2018    Controlled diabetes mellitus type 2 with complications ([facility] 01/88/2203    Mixed hyperlipidemia 04/10/2018    Essential hypertension 04/10/2018    Vitamin D deficiency 04/10/2018    Severe obesity (BMI 35.0-39. 9) with comorbidity ([facility] 04/10/2018     Current Outpatient Medications   Medication Sig Dispense Refill    FLUoxetine (PROzac) 40 mg capsule Take 40 mg by mouth daily.  rosuvastatin (CRESTOR) 40 mg tablet Take 40 mg by mouth daily.  amLODIPine (NORVASC) 10 mg tablet TAKE 1 TABLET BY MOUTH ONCE DAILY 30 Tablet 0    spironolactone (ALDACTONE) 50 mg tablet two (2) times a day.  metFORMIN (GLUCOPHAGE) 500 mg tablet Take  by mouth two (2) times daily (with meals).  potassium chloride (KLOR-CON) 10 mEq tablet Take 1 Tab by mouth daily. 30 Tab 5    exenatide microspheres (BYDUREON) 2 mg/0.65 mL pnij 2 mg by SubCUTAneous route every seven (7) days. Indications: type 2 diabetes mellitus 0.65 mL 3    Insulin Needles, Disposable, (BD ULTRA-FINE MICRO PEN NEEDLE) 32 gauge x 1/4\" ndle 1 Box by Does Not Apply route two (2) times a day. 100 Pen Needle 0    Blood-Glucose Meter monitoring kit Use once in the morning for fasting blood glucose and once two hours after a meal 1 Kit 0    glucose blood VI test strips (BLOOD GLUCOSE TEST) strip Use once in the morning for fasting blood glucose and once two hours after a meal 100 Strip 0    lancets 32 gauge misc 100 Each by Does Not Apply route two (2) times daily (with meals). 100 Lancet 0     No Known Allergies  No past medical history on file. No past surgical history on file.   Family History   Problem Relation Age of Onset    Hypertension Mother     Hypertension Father     Hypertension Paternal Grandmother    Damion Shaw Hypertension Paternal Grandfather      Social History     Tobacco Use    Smoking status: Former Smoker     Start date: 2008     Quit date: 2013     Years since quittin.1    Smokeless tobacco: Never Used   Substance Use Topics    Alcohol use: Yes     Alcohol/week: 2.0 - 3.0 standard drinks     Types: 1 - 2 Glasses of wine, 1 Shots of liquor per week       Review of Systems   Constitutional: Negative for fever. HENT: Negative for congestion. Eyes: Negative for blurred vision. Respiratory: Negative for cough and shortness of breath. Cardiovascular: Negative for chest pain and leg swelling. Gastrointestinal: Negative for abdominal pain, blood in stool, nausea and vomiting. Genitourinary: Negative. Musculoskeletal: Negative for falls. Neurological: Negative for dizziness. Psychiatric/Behavioral: Negative for substance abuse and suicidal ideas. The patient is nervous/anxious (controlled on medication).         Objective:     Patient-Reported Vitals 2021   Patient-Reported Systolic  957   Patient-Reported Diastolic 86        [INSTRUCTIONS:  \"[x]\" Indicates a positive item  \"[]\" Indicates a negative item  -- DELETE ALL ITEMS NOT EXAMINED]    Constitutional: [x] Appears well-developed and well-nourished [x] No apparent distress      [] Abnormal -     Mental status: [x] Alert and awake  [x] Oriented to person/place/time [x] Able to follow commands    [] Abnormal -     Eyes:   EOM    [x]  Normal    [] Abnormal -   Sclera  [x]  Normal    [] Abnormal -          Discharge [x]  None visible   [] Abnormal -     HENT: [x] Normocephalic, atraumatic  [] Abnormal -   [x] Mouth/Throat: Mucous membranes are moist    External Ears [x] Normal  [] Abnormal -    Neck: [x] No visualized mass [] Abnormal -     Pulmonary/Chest: [x] Respiratory effort normal   [x] No visualized signs of difficulty breathing or respiratory distress        [] Abnormal -      Musculoskeletal:   [x] Normal gait with no signs of ataxia         [x] Normal range of motion of neck        [] Abnormal -     Neurological:        [x] No Facial Asymmetry (Cranial nerve 7 motor function) (limited exam due to video visit)          [x] No gaze palsy        [] Abnormal -          Skin:        [x] No significant exanthematous lesions or discoloration noted on facial skin         [] Abnormal -            Psychiatric:       [x] Normal Affect [] Abnormal -        [x] No Hallucinations    We discussed the expected course, resolution and complications of the diagnosis(es) in detail. Medication risks, benefits, costs, interactions, and alternatives were discussed as indicated. I advised her to contact the office if her condition worsens, changes or fails to improve as anticipated. She expressed understanding with the diagnosis(es) and plan. Daniela Owens, was evaluated through a synchronous (real-time) audio-video encounter. The patient (or guardian if applicable) is aware that this is a billable service. Verbal consent to proceed has been obtained within the past 12 months. The visit was conducted pursuant to the emergency declaration under the [address] [place] [address] authority and the [employer] and [employer] General Act. Patient identification was verified, and a caregiver was present when appropriate. The patient was located in a state where the provider was credentialed to provide care.       Griffin Plata NP

## 2021-07-29 NOTE — PROGRESS NOTES
Leola Malone presents today for   Chief Complaint   Patient presents with    Hypertension     BP  138/86       Alexa Goel preferred language for health care discussion is english/other. Is someone accompanying this pt? no    Is the patient using any DME equipment during [address]? no    Depression Screening:  3 most recent PHQ Screens 7/29/2021   Little interest or pleasure in doing things Not at all   Feeling down, depressed, irritable, or hopeless Not at all   Total Score PHQ 2 0       Learning Assessment:  Learning Assessment 7/29/2021   PRIMARY LEARNER Patient   HIGHEST LEVEL OF EDUCATION - PRIMARY LEARNER  > 4 YEARS [facility] PRIMARY LEARNER NONE   CO-LEARNER CAREGIVER No   PRIMARY LANGUAGE ENGLISH    NEED No   LEARNER PREFERENCE PRIMARY DEMONSTRATION   ANSWERED BY patient   RELATIONSHIP SELF       Abuse Screening:  Abuse Screening Questionnaire 7/29/2021   Do you ever feel afraid of your partner? N   Are you in a relationship with someone who physically or mentally threatens you? N   Is it safe for you to go home? Y       Generalized Anxiety  No flowsheet data found. Health Maintenance Due   Topic Date Due    Hepatitis C Screening  Never done    COVID-19 Vaccine (1) Never done    PAP AKA CERVICAL CYTOLOGY  Never done    A1C test (Diabetic or Prediabetic)  01/27/2021    MICROALBUMIN Q1  06/19/2021   . Health Maintenance reviewed and discussed and ordered per Provider. Coordination of Care:  1. Have you been to the ER, urgent care clinic since your last visit? Hospitalized since your last visit? no    2. Have you seen or consulted any other health care providers outside of the [address] [place] [address] since your last visit? Include any pap smears or colon screening. Yes, counselor      Advance Directive:  1. Do you have an advance directive in place?  Patient Reply:no

## 2022-03-30 ENCOUNTER — TELEPHONE (OUTPATIENT)
Dept: FAMILY MEDICINE CLINIC | Age: 32
End: 2022-03-30

## 2022-03-30 NOTE — TELEPHONE ENCOUNTER
[facility] to inquire about last A1c. She last saw Aquiles Jiang NP in June of 2021. Her last A1c was in Jan of 2020, which we received.

## 2023-05-11 NOTE — TELEPHONE ENCOUNTER
Patient asks that her provider switch her from Metformin XR to the regular Metformin as the extended release is too much. Message sent to patients PCP. Home